# Patient Record
Sex: MALE | Race: WHITE | Employment: OTHER
[De-identification: names, ages, dates, MRNs, and addresses within clinical notes are randomized per-mention and may not be internally consistent; named-entity substitution may affect disease eponyms.]

---

## 2017-01-26 PROBLEM — D37.5 NEOPLASM OF UNCERTAIN BEHAVIOR OF STOMACH, INTESTINES, AND RECTUM: Status: ACTIVE | Noted: 2017-01-26

## 2017-01-26 PROBLEM — D37.8 NEOPLASM OF UNCERTAIN BEHAVIOR OF STOMACH, INTESTINES, AND RECTUM: Status: ACTIVE | Noted: 2017-01-26

## 2017-01-26 PROBLEM — D37.1 NEOPLASM OF UNCERTAIN BEHAVIOR OF STOMACH, INTESTINES, AND RECTUM: Status: ACTIVE | Noted: 2017-01-26

## 2017-01-26 PROBLEM — Z83.719 FAMILY HISTORY OF COLONIC POLYPS: Status: ACTIVE | Noted: 2017-01-26

## 2017-01-26 PROBLEM — Z83.71 FAMILY HISTORY OF COLONIC POLYPS: Status: ACTIVE | Noted: 2017-01-26

## 2017-01-26 PROBLEM — K22.2 STRICTURE AND STENOSIS OF ESOPHAGUS: Status: ACTIVE | Noted: 2017-01-26

## 2017-02-03 ENCOUNTER — SURGERY (OUTPATIENT)
Age: 67
End: 2017-02-03

## 2017-02-03 ENCOUNTER — HOSPITAL ENCOUNTER (OUTPATIENT)
Facility: HOSPITAL | Age: 67
Setting detail: HOSPITAL OUTPATIENT SURGERY
Discharge: HOME | End: 2017-02-03
Attending: INTERNAL MEDICINE | Admitting: INTERNAL MEDICINE
Payer: COMMERCIAL

## 2017-02-03 PROCEDURE — 0DB48ZX EXCISION OF ESOPHAGOGASTRIC JUNCTION, VIA NATURAL OR ARTIFICIAL OPENING ENDOSCOPIC, DIAGNOSTIC: ICD-10-PCS | Performed by: INTERNAL MEDICINE

## 2017-02-03 PROCEDURE — 88312 SPECIAL STAINS GROUP 1: CPT | Performed by: INTERNAL MEDICINE

## 2017-02-03 PROCEDURE — 0D748ZZ DILATION OF ESOPHAGOGASTRIC JUNCTION, VIA NATURAL OR ARTIFICIAL OPENING ENDOSCOPIC: ICD-10-PCS | Performed by: INTERNAL MEDICINE

## 2017-02-03 PROCEDURE — 88305 TISSUE EXAM BY PATHOLOGIST: CPT | Performed by: INTERNAL MEDICINE

## 2017-02-03 RX ORDER — SODIUM CHLORIDE, SODIUM LACTATE, POTASSIUM CHLORIDE, CALCIUM CHLORIDE 600; 310; 30; 20 MG/100ML; MG/100ML; MG/100ML; MG/100ML
INJECTION, SOLUTION INTRAVENOUS CONTINUOUS
Status: DISCONTINUED | OUTPATIENT
Start: 2017-02-03 | End: 2017-02-03

## 2017-02-03 RX ORDER — MIDAZOLAM HYDROCHLORIDE 1 MG/ML
INJECTION INTRAMUSCULAR; INTRAVENOUS
Status: DISCONTINUED | OUTPATIENT
Start: 2017-02-03 | End: 2017-02-03

## 2017-02-03 RX ORDER — SODIUM CHLORIDE 0.9 % (FLUSH) 0.9 %
10 SYRINGE (ML) INJECTION AS NEEDED
Status: DISCONTINUED | OUTPATIENT
Start: 2017-02-03 | End: 2017-02-03

## 2017-02-03 RX ORDER — MIDAZOLAM HYDROCHLORIDE 1 MG/ML
1 INJECTION INTRAMUSCULAR; INTRAVENOUS EVERY 5 MIN PRN
Status: DISCONTINUED | OUTPATIENT
Start: 2017-02-03 | End: 2017-02-03

## 2017-02-03 NOTE — OPERATIVE REPORT
ESOPHAGOGASTRODUODENOSCOPY REPORT    Patient Name:  Ardena Simmonds Record #: [id number]  YOB: 1950  Date of Procedure: 2/3/2017    Referring physician: Lonnie Nelson MD    Surgeon:  Abebe Payne.  Ronan Mcmanus MD    Pre-op diagnosis: Dogu Rees

## 2017-02-03 NOTE — H&P
PRE-PROCEDURE UPDATE    HPI: Varinder Marroquin is a 61year old male. 4/6/1950. Patient presents for an Esophagogastroduodenoscopy. ALLERGIES: No Known Allergies      No current outpatient prescriptions on file.   Past Medical History   Diagnosis Date

## 2017-02-04 VITALS
RESPIRATION RATE: 18 BRPM | OXYGEN SATURATION: 96 % | HEART RATE: 52 BPM | HEIGHT: 69 IN | WEIGHT: 165 LBS | BODY MASS INDEX: 24.44 KG/M2 | DIASTOLIC BLOOD PRESSURE: 70 MMHG | SYSTOLIC BLOOD PRESSURE: 130 MMHG

## 2017-05-24 ENCOUNTER — HOSPITAL (OUTPATIENT)
Dept: OTHER | Age: 67
End: 2017-05-24
Attending: ANESTHESIOLOGY

## 2017-07-10 ENCOUNTER — APPOINTMENT (OUTPATIENT)
Dept: LAB | Facility: HOSPITAL | Age: 67
End: 2017-07-10
Attending: UROLOGY
Payer: MEDICARE

## 2017-07-10 PROCEDURE — 84153 ASSAY OF PSA TOTAL: CPT | Performed by: UROLOGY

## 2017-07-10 PROCEDURE — 81003 URINALYSIS AUTO W/O SCOPE: CPT | Performed by: UROLOGY

## 2017-07-12 ENCOUNTER — TELEPHONE (OUTPATIENT)
Dept: NEUROLOGY | Facility: CLINIC | Age: 67
End: 2017-07-12

## 2017-07-12 ENCOUNTER — OFFICE VISIT (OUTPATIENT)
Dept: SURGERY | Facility: CLINIC | Age: 67
End: 2017-07-12

## 2017-07-12 VITALS
HEART RATE: 48 BPM | HEIGHT: 69 IN | BODY MASS INDEX: 24.44 KG/M2 | DIASTOLIC BLOOD PRESSURE: 62 MMHG | RESPIRATION RATE: 16 BRPM | WEIGHT: 165 LBS | SYSTOLIC BLOOD PRESSURE: 108 MMHG

## 2017-07-12 DIAGNOSIS — N52.01 ERECTILE DYSFUNCTION DUE TO ARTERIAL INSUFFICIENCY: ICD-10-CM

## 2017-07-12 DIAGNOSIS — C61 PROSTATE CANCER (HCC): Primary | ICD-10-CM

## 2017-07-12 DIAGNOSIS — R39.15 URINARY URGENCY: ICD-10-CM

## 2017-07-12 DIAGNOSIS — R35.1 NOCTURIA: ICD-10-CM

## 2017-07-12 PROCEDURE — 99214 OFFICE O/P EST MOD 30 MIN: CPT | Performed by: UROLOGY

## 2017-07-12 PROCEDURE — 99212 OFFICE O/P EST SF 10 MIN: CPT | Performed by: UROLOGY

## 2017-07-12 RX ORDER — OXYBUTYNIN CHLORIDE 5 MG/1
TABLET ORAL
Qty: 10 TABLET | Refills: 1 | Status: ON HOLD | OUTPATIENT
Start: 2017-07-12 | End: 2018-11-28

## 2017-07-12 NOTE — PROGRESS NOTES
HPI:    Patient ID: eTssa Reyes is a 74year old male. HPI     1. Nocturia  Patient's AUA score today was 7, mild voiding dysfunction category better than AUA 9.5 on chart review (8/3/2016).   The patient has urinary frequency less than every 2 hours obstruction; 5 mm erythematous lesion removed and it was benign and retrograde pyelograms normal. 3/25/13 palladium brachytherapy.  The patient in consultation office 5/23/14; complains of erectile dysfunction; I started patient on Viagra 50 mg when necess every morning before breakfast. Disp: 90 tablet Rfl: 0   Tadalafil (CIALIS) 20 MG Oral Tab Please take 1-2 hours before planned sexual activity; do not take with alcohol.  Disp: 5 tablet Rfl: 11     Allergies:No Known Allergies      PHYSICAL EXAM:   Physica (R35.1) Nocturia  Plan: Patient's AUA score today was 7, mild voiding dysfunction category better than AUA 9.5 on chart review (8/3/2016).   The patient has urinary frequency less than every 2 hours, intermittent stream, weak stream and nocturia 1 x per nig entries made by the scribe were at my direction and in my presence. I have reviewed the chart and discharge instructions (if applicable) and agree that the record reflects my personal performance and is accurate and complete.   Kacie Ramos MD, 7/12/2

## 2017-07-12 NOTE — PATIENT INSTRUCTIONS
1. For occasional urinary urgency when traveling, Oxybutynin 5 mg P.O.     2. For your erectile dysfunction, consider taking Viagra 50 mg simultaneously with either a Tylenol or Aleve (because of the headaches you experience from the Viagra).     3. Continu effects of oral ED medications  · Headache  · Facial flushing  · Runny or stuffy nose  · Indigestion  · Distortion of your color vision for a short time  · Sudden vision loss or hearing loss (rare)  Risks of oral ED medications  · Do not take ED medication

## 2017-07-13 ENCOUNTER — OFFICE VISIT (OUTPATIENT)
Dept: NEUROLOGY | Facility: CLINIC | Age: 67
End: 2017-07-13

## 2017-07-13 ENCOUNTER — TELEPHONE (OUTPATIENT)
Dept: NEUROLOGY | Facility: CLINIC | Age: 67
End: 2017-07-13

## 2017-07-13 VITALS
BODY MASS INDEX: 24.44 KG/M2 | HEIGHT: 69 IN | HEART RATE: 57 BPM | WEIGHT: 165 LBS | DIASTOLIC BLOOD PRESSURE: 64 MMHG | OXYGEN SATURATION: 95 % | SYSTOLIC BLOOD PRESSURE: 132 MMHG

## 2017-07-13 DIAGNOSIS — M54.16 LUMBAR RADICULOPATHY: Primary | ICD-10-CM

## 2017-07-13 DIAGNOSIS — G89.29 CHRONIC RIGHT-SIDED LOW BACK PAIN WITH RIGHT-SIDED SCIATICA: ICD-10-CM

## 2017-07-13 DIAGNOSIS — M54.41 CHRONIC RIGHT-SIDED LOW BACK PAIN WITH RIGHT-SIDED SCIATICA: ICD-10-CM

## 2017-07-13 PROCEDURE — 99204 OFFICE O/P NEW MOD 45 MIN: CPT | Performed by: PHYSICAL MEDICINE & REHABILITATION

## 2017-07-13 NOTE — PROGRESS NOTES
Low Back Pain H & P    Chief Complaint:  Patient presents with:  Low Back Pain: referred by Dr. Hillary Chandra, pt c/o lower back pain and states it is mostly in lower right side, pain is localized, denies any numbness or tingling, says pain is constant worse at arthroscopy   • Other and unspecified hyperlipidemia    • Prostate cancer [facility]) 2013    Per NG: [place] Prostate-Seeds implant with Dr. Jer Cummins       Past Surgical History   Past Surgical History:  10/12: COLONOSCOPY  2/3/2017: EGD N/A      Comment: Procedur bruising  Allergic/Immuno: Negative for contact allergy, environment allergies, food allergies, seasonal allergies. Gait:   The patient has no difficulty walking. PE:  The patient does appear in his stated age in no distress.   The patient is well g bilateral lower extremities     Hip: Hips are stable.     RIGHT hip ROM mild-moderately limited   LEFT hip ROM mildly limited   RIGHT hip flexion Negative pain   LEFT hip flexion Negative pain   RIGHT hip NATHALY test Negative for pain   LEFT hip NATHALY test N

## 2017-07-13 NOTE — PATIENT INSTRUCTIONS
Plan  He will get a MRI of the lumbar spine. I will do an EMG/NCS of the right leg looking for a right L5-S1 radiculopathy versus a sciatic neuropathy versus a right lateral femoral cutaneous neuropathy.     The patient will continue with his home exer

## 2017-07-14 NOTE — TELEPHONE ENCOUNTER
Pt. informed insurance was verified and MRI L-spine wo and Physical therapy are a covered benefit and they do not require authorization. Can proceed with scheduling appts. Rohit Christensen

## 2017-07-24 ENCOUNTER — HOSPITAL ENCOUNTER (OUTPATIENT)
Dept: MRI IMAGING | Facility: HOSPITAL | Age: 67
Discharge: HOME | End: 2017-07-24
Attending: PHYSICAL MEDICINE & REHABILITATION
Payer: MEDICARE

## 2017-07-24 DIAGNOSIS — M54.16 LUMBAR RADICULOPATHY: ICD-10-CM

## 2017-07-24 PROCEDURE — 72148 MRI LUMBAR SPINE W/O DYE: CPT | Performed by: PHYSICAL MEDICINE & REHABILITATION

## 2017-07-25 ENCOUNTER — APPOINTMENT (OUTPATIENT)
Dept: PHYSICAL THERAPY | Facility: HOSPITAL | Age: 67
End: 2017-07-25
Attending: PHYSICAL MEDICINE & REHABILITATION
Payer: MEDICARE

## 2017-08-07 ENCOUNTER — OFFICE VISIT (OUTPATIENT)
Dept: PHYSICAL THERAPY | Facility: HOSPITAL | Age: 67
End: 2017-08-07
Attending: PHYSICAL MEDICINE & REHABILITATION
Payer: MEDICARE

## 2017-08-07 DIAGNOSIS — G89.29 CHRONIC RIGHT-SIDED LOW BACK PAIN WITH RIGHT-SIDED SCIATICA: ICD-10-CM

## 2017-08-07 DIAGNOSIS — M54.16 LUMBAR RADICULOPATHY: ICD-10-CM

## 2017-08-07 DIAGNOSIS — M54.41 CHRONIC RIGHT-SIDED LOW BACK PAIN WITH RIGHT-SIDED SCIATICA: ICD-10-CM

## 2017-08-07 PROCEDURE — 97140 MANUAL THERAPY 1/> REGIONS: CPT

## 2017-08-07 PROCEDURE — 97161 PT EVAL LOW COMPLEX 20 MIN: CPT

## 2017-08-07 NOTE — PROGRESS NOTES
LUMBAR SPINE EVALUATION:   Referring Physician: Dr. Calderon Nurse  Diagnosis: Lumbar radiculopathy (M54.16)  Chronic right-sided low back pain with right-sided sciatica (M54.41,G89.29)   Date of Service: 8/7/2017   Date of Onset: ~1 week ago  PATIENT SUMMARY therapy with c/o LBP R>L. Denies any radicular symptoms currently. Denies any bowel/bladder dysfunction. Reports current pain  0/10 and states pain increases to an 8/10 when present.  Objective testing demonstrates global lumbar/LE AROM WNL except for 25% l 25% limited in global hip ROM B.      Accessory Joint Mobilization: Lumbar Spine  Central: mild hypomobility  Unilateral: mild hypomobility    MMT STRENGTH TESTING:  R/L, -/5   8/7/2017   Hip Flexion 4/4   Hip Extension 4/4   Hip Adduction 5/5   Hip Abducti twisting motions. PLAN OF CARE:    Long Term Goals Timeframe (8/7-10/9)  1.  Patient to be independent with progressive HEP during and upon discharge to aide with symptom management and return to previous level of function.    2. Patient to increase g my care.     X___________________________________________________ Date____________________

## 2017-08-17 ENCOUNTER — OFFICE VISIT (OUTPATIENT)
Dept: PHYSICAL THERAPY | Facility: HOSPITAL | Age: 67
End: 2017-08-17
Attending: INTERNAL MEDICINE
Payer: MEDICARE

## 2017-08-17 PROCEDURE — 97112 NEUROMUSCULAR REEDUCATION: CPT

## 2017-08-17 PROCEDURE — 97140 MANUAL THERAPY 1/> REGIONS: CPT

## 2017-08-17 NOTE — PROGRESS NOTES
Dx: Lumbar radiculopathy (M54.16)  Chronic right-sided low back pain with right-sided sciatica (M54.41,G89.29)          Authorized # of Visits/Insurance:  Medicare  Script Dates: 8/7-10/9           Next MD visit: none scheduled  Referring MD: Jamia Simon symptom management and return to previous level of function.    2. Patient to increase global proximal hip/core strength 1/2 MMT in order to improve lumbopelvic stability and load transferring during repetitive twisting/bending activity such as golfing and

## 2017-08-23 ENCOUNTER — OFFICE VISIT (OUTPATIENT)
Dept: PHYSICAL THERAPY | Facility: HOSPITAL | Age: 67
End: 2017-08-23
Attending: PHYSICAL MEDICINE & REHABILITATION
Payer: MEDICARE

## 2017-08-23 DIAGNOSIS — G89.29 CHRONIC RIGHT-SIDED LOW BACK PAIN WITH RIGHT-SIDED SCIATICA: ICD-10-CM

## 2017-08-23 DIAGNOSIS — M54.41 CHRONIC RIGHT-SIDED LOW BACK PAIN WITH RIGHT-SIDED SCIATICA: ICD-10-CM

## 2017-08-23 DIAGNOSIS — M54.16 LUMBAR RADICULOPATHY: ICD-10-CM

## 2017-08-23 PROCEDURE — 97140 MANUAL THERAPY 1/> REGIONS: CPT

## 2017-08-23 PROCEDURE — 97112 NEUROMUSCULAR REEDUCATION: CPT

## 2017-08-23 NOTE — PROGRESS NOTES
Dx: Lumbar radiculopathy (M54.16)  Chronic right-sided low back pain with right-sided sciatica (M54.41,G89.29)          Authorized # of Visits/Insurance:  Medicare  Script Dates: 8/7-10/9           Next MD visit: none scheduled  Referring MD: Jamia Simon requiring moderate verbal/visual/tactile cueing to achieve. Plan: Continue to focus on lumbar spine unloading via TA activation, lumbopelvic stabilization, and LE/core strength progression as tolerated.      Charges: 1 Man, 2 NMR      Total Timed Treatm

## 2017-09-18 ENCOUNTER — OFFICE VISIT (OUTPATIENT)
Dept: PHYSICAL THERAPY | Facility: HOSPITAL | Age: 67
End: 2017-09-18
Attending: PHYSICAL MEDICINE & REHABILITATION
Payer: MEDICARE

## 2017-09-18 PROCEDURE — 97112 NEUROMUSCULAR REEDUCATION: CPT

## 2017-09-18 PROCEDURE — 97140 MANUAL THERAPY 1/> REGIONS: CPT

## 2017-09-18 NOTE — PROGRESS NOTES
Patient Name: Avery Herman  YOB: 1950          MRN number:  [id number]  Date:  9/18/2017  Referring Physician:  Sandra Calvo  Dx: Lumbar radiculopathy (M54.16)  Chronic right-sided low back pain with right-sided sciatica (M54.41,G89.29) demonstrated any significant change in symptoms since starting therapy, but has been able to manage symptoms independently at home and has been consistent with HEP.  Recommend discharge appropriate at this time and to follow-up with MD. Reviewed/updated HEP SKTC, DKTC, LTR, piriformis stretch seated   8/17: TA Chepermann progression, TA transition from supine to standing  8/23: standing hip flex/ext/abd, brad madera   9/18: SB iso TA/RA/Oblique, supine SLR, S/L hip abd, prone hip ext, TA with functional m Changing and Maintaining Body Position CI: 1%-19% impaired, limited, or restricted    Rehab Potential: good  Clinical Presentation: stable    Plan: Patient discharged to independent home program. Will follow-up with MD in October.      Patient was advised o

## 2017-10-11 PROBLEM — M51.26 LUMBAR HERNIATED DISC: Status: ACTIVE | Noted: 2017-10-11

## 2017-10-11 PROBLEM — M48.061 LUMBAR FORAMINAL STENOSIS: Status: ACTIVE | Noted: 2017-10-11

## 2017-10-11 PROBLEM — M43.16 SPONDYLOLISTHESIS OF LUMBAR REGION: Status: ACTIVE | Noted: 2017-10-11

## 2017-10-11 PROBLEM — M51.9 LUMBAR DISC DISEASE: Status: ACTIVE | Noted: 2017-10-11

## 2017-10-11 PROBLEM — M48.061 LUMBAR STENOSIS WITHOUT NEUROGENIC CLAUDICATION: Status: ACTIVE | Noted: 2017-10-11

## 2017-10-11 PROBLEM — M43.10 RETROLISTHESIS: Status: ACTIVE | Noted: 2017-10-11

## 2017-10-23 ENCOUNTER — OFFICE VISIT (OUTPATIENT)
Dept: NEUROLOGY | Facility: CLINIC | Age: 67
End: 2017-10-23

## 2017-10-23 VITALS
RESPIRATION RATE: 16 BRPM | WEIGHT: 165 LBS | HEIGHT: 69 IN | DIASTOLIC BLOOD PRESSURE: 60 MMHG | HEART RATE: 60 BPM | SYSTOLIC BLOOD PRESSURE: 110 MMHG | BODY MASS INDEX: 24.44 KG/M2

## 2017-10-23 DIAGNOSIS — M54.16 LUMBAR RADICULOPATHY: ICD-10-CM

## 2017-10-23 DIAGNOSIS — M48.061 LUMBAR STENOSIS WITHOUT NEUROGENIC CLAUDICATION: ICD-10-CM

## 2017-10-23 DIAGNOSIS — M43.10 RETROLISTHESIS: ICD-10-CM

## 2017-10-23 DIAGNOSIS — M54.41 CHRONIC RIGHT-SIDED LOW BACK PAIN WITH RIGHT-SIDED SCIATICA: ICD-10-CM

## 2017-10-23 DIAGNOSIS — G89.29 CHRONIC RIGHT-SIDED LOW BACK PAIN WITH RIGHT-SIDED SCIATICA: ICD-10-CM

## 2017-10-23 DIAGNOSIS — M43.16 SPONDYLOLISTHESIS OF LUMBAR REGION: Primary | ICD-10-CM

## 2017-10-23 DIAGNOSIS — M51.9 LUMBAR DISC DISEASE: ICD-10-CM

## 2017-10-23 DIAGNOSIS — M51.26 LUMBAR HERNIATED DISC: ICD-10-CM

## 2017-10-23 DIAGNOSIS — M48.061 LUMBAR FORAMINAL STENOSIS: ICD-10-CM

## 2017-10-23 PROCEDURE — 99214 OFFICE O/P EST MOD 30 MIN: CPT | Performed by: PHYSICAL MEDICINE & REHABILITATION

## 2017-10-23 NOTE — PATIENT INSTRUCTIONS
As of October 6th 2014, the Drug Enforcement Agency [facility]) is reclassifying all hydrocodone combination medications from Schedule III to Schedule II. This includes medications such as Norco, Vicodin, Lortab, Zohydro, and Vicoprofen.     What this means for y discussed with the patient his home exercise program.    He will follow up with Dr. Abad Viera about the Adenoma seen on the MRI scan.     We decided to hold on getting a right leg EMG/NCS and lumbar flexion and extension x-rays since the results will not change

## 2017-10-23 NOTE — PROGRESS NOTES
I spoke to Dr. Sia Moody who read the MRI and he stated that this is an old finding and has not changed since the patient's last CT scan.

## 2017-10-23 NOTE — PROGRESS NOTES
Low Back Pain H & P    Chief Complaint: Patient presents with:  Low Back Pain: LOV: 7/13/17. Patient is here for a f/u on low back pain. Pt had MRI done on 7/23/17. Pt completed PT w/ not much relief. Pt is continuing w/ home exercises.  Pt states he contin Kidney Disease Father      Per NG: Renal disease   • Heart Disease Father 80     Per NG: Cause of death        Social History     Social History  Social History   Marital status:   Spouse name: N/A    Years of education: N/A  Number of children: N/A Non-tender for bilateral Greater Trochanteric Bursa     Vascular lower extremity:   Dorsalis pedis pulse-RIGHT 2+   Dorsalis pedis pulse-LEFT 2+   Tibialis posterior pulse-RIGHT 2+   Tibialis posterior pulse-LEFT 2+     Neurological Lower Extremity:    Lig

## 2017-11-08 ENCOUNTER — HOSPITAL ENCOUNTER (OUTPATIENT)
Facility: HOSPITAL | Age: 67
Setting detail: HOSPITAL OUTPATIENT SURGERY
Discharge: HOME | End: 2017-11-08
Attending: INTERNAL MEDICINE | Admitting: INTERNAL MEDICINE
Payer: MEDICARE

## 2017-11-08 ENCOUNTER — SURGERY (OUTPATIENT)
Age: 67
End: 2017-11-08

## 2017-11-08 VITALS
SYSTOLIC BLOOD PRESSURE: 124 MMHG | DIASTOLIC BLOOD PRESSURE: 78 MMHG | HEART RATE: 60 BPM | OXYGEN SATURATION: 96 % | RESPIRATION RATE: 19 BRPM

## 2017-11-08 DIAGNOSIS — Z12.11 SCREENING FOR COLORECTAL CANCER: ICD-10-CM

## 2017-11-08 DIAGNOSIS — Z12.12 SCREENING FOR COLORECTAL CANCER: ICD-10-CM

## 2017-11-08 PROCEDURE — 88305 TISSUE EXAM BY PATHOLOGIST: CPT | Performed by: INTERNAL MEDICINE

## 2017-11-08 PROCEDURE — 0DBL8ZX EXCISION OF TRANSVERSE COLON, VIA NATURAL OR ARTIFICIAL OPENING ENDOSCOPIC, DIAGNOSTIC: ICD-10-PCS | Performed by: INTERNAL MEDICINE

## 2017-11-08 RX ORDER — MIDAZOLAM HYDROCHLORIDE 1 MG/ML
1 INJECTION INTRAMUSCULAR; INTRAVENOUS EVERY 5 MIN PRN
Status: DISCONTINUED | OUTPATIENT
Start: 2017-11-08 | End: 2017-11-08

## 2017-11-08 RX ORDER — MIDAZOLAM HYDROCHLORIDE 1 MG/ML
INJECTION INTRAMUSCULAR; INTRAVENOUS
Status: DISCONTINUED | OUTPATIENT
Start: 2017-11-08 | End: 2017-11-08

## 2017-11-08 RX ORDER — SODIUM CHLORIDE 0.9 % (FLUSH) 0.9 %
10 SYRINGE (ML) INJECTION AS NEEDED
Status: CANCELLED | OUTPATIENT
Start: 2017-11-08

## 2017-11-08 RX ORDER — SODIUM CHLORIDE, SODIUM LACTATE, POTASSIUM CHLORIDE, CALCIUM CHLORIDE 600; 310; 30; 20 MG/100ML; MG/100ML; MG/100ML; MG/100ML
INJECTION, SOLUTION INTRAVENOUS CONTINUOUS
Status: CANCELLED | OUTPATIENT
Start: 2017-11-08

## 2017-11-08 NOTE — H&P
RE-PROCEDURE UPDATE    HPI: Tonya Pierre is a 74year old male. 4/6/1950. Patient presents for a colonoscopy. ALLERGIES: No Known Allergies      No current outpatient prescriptions on file.   Past Medical History:   Diagnosis Date   • Bone spur

## 2017-11-08 NOTE — OPERATIVE REPORT
COLONOSCOPY REPORT    Patient Name:  Michaela Hickey Record #: [id number]  YOB: 1950  Date of Procedure: 11/8/2017    Referring physician: Jen Muniz MD    Surgeon:  Nya Watson.  Meryle Nailer, MD    Pre-op diagnosis: Colon cancer screenin

## 2017-12-05 ENCOUNTER — PRIOR ORIGINAL RECORDS (OUTPATIENT)
Dept: OTHER | Age: 67
End: 2017-12-05

## 2017-12-05 ENCOUNTER — LAB REQUISITION (OUTPATIENT)
Dept: LAB | Facility: HOSPITAL | Age: 67
End: 2017-12-05
Payer: MEDICARE

## 2017-12-05 DIAGNOSIS — Z85.46 PERSONAL HISTORY OF MALIGNANT NEOPLASM OF PROSTATE: ICD-10-CM

## 2017-12-05 DIAGNOSIS — E55.9 VITAMIN D DEFICIENCY: ICD-10-CM

## 2017-12-05 DIAGNOSIS — E78.00 PURE HYPERCHOLESTEROLEMIA: ICD-10-CM

## 2017-12-05 PROCEDURE — 85025 COMPLETE CBC W/AUTO DIFF WBC: CPT | Performed by: INTERNAL MEDICINE

## 2017-12-05 PROCEDURE — 84153 ASSAY OF PSA TOTAL: CPT | Performed by: INTERNAL MEDICINE

## 2017-12-05 PROCEDURE — 80061 LIPID PANEL: CPT | Performed by: INTERNAL MEDICINE

## 2017-12-05 PROCEDURE — 82306 VITAMIN D 25 HYDROXY: CPT | Performed by: INTERNAL MEDICINE

## 2017-12-05 PROCEDURE — 84443 ASSAY THYROID STIM HORMONE: CPT | Performed by: INTERNAL MEDICINE

## 2017-12-05 PROCEDURE — 80053 COMPREHEN METABOLIC PANEL: CPT | Performed by: INTERNAL MEDICINE

## 2017-12-07 LAB
ALBUMIN: 4 G/DL
ALKALINE PHOSPHATATE(ALK PHOS): 66 IU/L
BILIRUBIN TOTAL: 0.8 MG/DL
BUN: 16 MG/DL
CALCIUM: 9.4 MG/DL
CHLORIDE: 106 MEQ/L
CREATININE, SERUM: 1.15 MG/DL
GLOBULIN: 2.6 G/DL
GLUCOSE: 99 MG/DL
HEMATOCRIT: 43.7 %
HEMOGLOBIN: 14.8 G/DL
MCH: 30.8 PG
MCHC: 33.6 G/DL
MCV: 90.9 FL
PLATELETS: 186 K/UL
POTASSIUM, SERUM: 4.1 MEQ/L
PROTEIN, TOTAL: 6.6 G/DL
RED BLOOD COUNT: 4.81 X 10-6/U
SGOT (AST): 25 IU/L
SGPT (ALT): 24 IU/L
SODIUM: 139 MEQ/L
VITAMIN D 25-OH: 29.3 NG/ML
WHITE BLOOD COUNT: 5.5 X 10-3/U

## 2017-12-11 ENCOUNTER — PRIOR ORIGINAL RECORDS (OUTPATIENT)
Dept: OTHER | Age: 67
End: 2017-12-11

## 2017-12-11 LAB
ALT (SGPT): 24 U/L
AST (SGOT): 25 U/L
CHOLESTEROL, TOTAL: 162 MG/DL
GLUCOSE: 99 MG/DL
GLYCOHEMOGLOBIN: 1.32 %
HDL CHOLESTEROL: 52 MG/DL
LDL CHOLESTEROL: 95 MG/DL
TRIGLYCERIDES: 75 MG/DL

## 2017-12-18 ENCOUNTER — PRIOR ORIGINAL RECORDS (OUTPATIENT)
Dept: OTHER | Age: 67
End: 2017-12-18

## 2017-12-19 ENCOUNTER — MYAURORA ACCOUNT LINK (OUTPATIENT)
Dept: OTHER | Age: 67
End: 2017-12-19

## 2017-12-22 ENCOUNTER — PRIOR ORIGINAL RECORDS (OUTPATIENT)
Dept: OTHER | Age: 67
End: 2017-12-22

## 2017-12-22 ENCOUNTER — MYAURORA ACCOUNT LINK (OUTPATIENT)
Dept: OTHER | Age: 67
End: 2017-12-22

## 2017-12-27 ENCOUNTER — HOSPITAL ENCOUNTER (OUTPATIENT)
Dept: CV DIAGNOSTICS | Facility: HOSPITAL | Age: 67
Discharge: HOME | End: 2017-12-27
Attending: INTERNAL MEDICINE
Payer: MEDICARE

## 2017-12-27 DIAGNOSIS — I49.9 SUPRAVENTRICULAR ARRHYTHMIA: ICD-10-CM

## 2017-12-27 PROCEDURE — 93227 XTRNL ECG REC<48 HR R&I: CPT | Performed by: INTERNAL MEDICINE

## 2017-12-28 ENCOUNTER — HOSPITAL ENCOUNTER (OUTPATIENT)
Dept: ULTRASOUND IMAGING | Facility: HOSPITAL | Age: 67
Discharge: HOME | End: 2017-12-28
Attending: INTERNAL MEDICINE
Payer: MEDICARE

## 2017-12-28 ENCOUNTER — HOSPITAL ENCOUNTER (OUTPATIENT)
Dept: CV DIAGNOSTICS | Facility: HOSPITAL | Age: 67
Discharge: HOME | End: 2017-12-28
Attending: INTERNAL MEDICINE
Payer: MEDICARE

## 2017-12-28 DIAGNOSIS — R09.89 CAROTID BRUIT: ICD-10-CM

## 2017-12-28 PROCEDURE — 93225 XTRNL ECG REC<48 HRS REC: CPT | Performed by: INTERNAL MEDICINE

## 2017-12-28 PROCEDURE — 93880 EXTRACRANIAL BILAT STUDY: CPT | Performed by: INTERNAL MEDICINE

## 2017-12-29 ENCOUNTER — PRIOR ORIGINAL RECORDS (OUTPATIENT)
Dept: OTHER | Age: 67
End: 2017-12-29

## 2018-01-04 ENCOUNTER — PRIOR ORIGINAL RECORDS (OUTPATIENT)
Dept: OTHER | Age: 68
End: 2018-01-04

## 2018-01-05 ENCOUNTER — PRIOR ORIGINAL RECORDS (OUTPATIENT)
Dept: OTHER | Age: 68
End: 2018-01-05

## 2018-06-26 ENCOUNTER — APPOINTMENT (OUTPATIENT)
Dept: LAB | Facility: HOSPITAL | Age: 68
End: 2018-06-26
Attending: INTERNAL MEDICINE
Payer: MEDICARE

## 2018-06-26 ENCOUNTER — HOSPITAL ENCOUNTER (OUTPATIENT)
Dept: GENERAL RADIOLOGY | Facility: HOSPITAL | Age: 68
Discharge: HOME | End: 2018-06-26
Attending: INTERNAL MEDICINE
Payer: MEDICARE

## 2018-06-26 DIAGNOSIS — R35.1 NOCTURIA: ICD-10-CM

## 2018-06-26 DIAGNOSIS — R39.15 URINARY URGENCY: ICD-10-CM

## 2018-06-26 DIAGNOSIS — C61 PROSTATE CANCER (HCC): ICD-10-CM

## 2018-06-26 DIAGNOSIS — S79.912A INJURY OF LEFT HIP, INITIAL ENCOUNTER: ICD-10-CM

## 2018-06-26 PROCEDURE — 84153 ASSAY OF PSA TOTAL: CPT

## 2018-06-26 PROCEDURE — 73502 X-RAY EXAM HIP UNI 2-3 VIEWS: CPT | Performed by: INTERNAL MEDICINE

## 2018-06-26 PROCEDURE — 36415 COLL VENOUS BLD VENIPUNCTURE: CPT

## 2018-06-26 PROCEDURE — 81003 URINALYSIS AUTO W/O SCOPE: CPT

## 2018-06-27 ENCOUNTER — PRIOR ORIGINAL RECORDS (OUTPATIENT)
Dept: OTHER | Age: 68
End: 2018-06-27

## 2018-07-13 ENCOUNTER — OFFICE VISIT (OUTPATIENT)
Dept: SURGERY | Facility: CLINIC | Age: 68
End: 2018-07-13

## 2018-07-13 VITALS
DIASTOLIC BLOOD PRESSURE: 72 MMHG | TEMPERATURE: 98 F | BODY MASS INDEX: 24.44 KG/M2 | HEIGHT: 69 IN | SYSTOLIC BLOOD PRESSURE: 123 MMHG | WEIGHT: 165 LBS | RESPIRATION RATE: 16 BRPM | HEART RATE: 40 BPM

## 2018-07-13 DIAGNOSIS — R35.1 NOCTURIA: ICD-10-CM

## 2018-07-13 DIAGNOSIS — C61 PROSTATE CANCER (HCC): Primary | ICD-10-CM

## 2018-07-13 DIAGNOSIS — N52.01 ERECTILE DYSFUNCTION DUE TO ARTERIAL INSUFFICIENCY: ICD-10-CM

## 2018-07-13 PROCEDURE — G0463 HOSPITAL OUTPT CLINIC VISIT: HCPCS | Performed by: UROLOGY

## 2018-07-13 PROCEDURE — 99214 OFFICE O/P EST MOD 30 MIN: CPT | Performed by: UROLOGY

## 2018-07-13 NOTE — PATIENT INSTRUCTIONS
Visit in 1 year. Blood draw for PSA and urinalysis urine test 1--10 days before visit. No sexual stimulation for 5 days before the actual PSA blood draw. The heart healthy diet is the prostate healthy diet.  Eat healthy food choices from the [address] [place] [address]

## 2018-07-15 NOTE — PROGRESS NOTES
HPI:    Patient ID: Austin Kelly is a 79year old male. HPI     1.  Nocturia  Patient's AUA score today was 8, beginning of the moderate voiding dysfunction category, just slightly worse than the score of 7 that he had last visit 7/12/17 on chart revi erythematous lesion removed and it was benign and retrograde pyelograms normal. 3/25/13 palladium brachytherapy.  The patient in consultation office 5/23/14; complains of erectile dysfunction; I started patient on Viagra 50 mg when necessary; he also had p Glucosamine-Chondroitin-MSM ([facility] GLUCOSAMINE-CHONDROIT-MSM) 375-300-250 MG Oral Tab Take 1 tablet by mouth daily. Disp:  Rfl:    CRESTOR 10 MG Oral Tab Take 10 mg by mouth nightly.    Disp:  Rfl:    Oxybutynin Chloride 5 MG Oral Tab Take 1 tab daily as this condition is stable. Though he has Viagra at home, he does not use it. I did again discuss that he may try the Viagra together with Tylenol or Aleve as this may improve the headache that he develops upon taking Viagra;  I explained other treatment op

## 2018-11-20 ENCOUNTER — LAB REQUISITION (OUTPATIENT)
Dept: LAB | Facility: HOSPITAL | Age: 68
End: 2018-11-20
Payer: MEDICARE

## 2018-11-20 DIAGNOSIS — M79.10 MYALGIA, UNSPECIFIED SITE: ICD-10-CM

## 2018-11-20 DIAGNOSIS — R53.83 OTHER FATIGUE: ICD-10-CM

## 2018-11-20 PROCEDURE — 82550 ASSAY OF CK (CPK): CPT | Performed by: INTERNAL MEDICINE

## 2018-11-20 PROCEDURE — 85025 COMPLETE CBC W/AUTO DIFF WBC: CPT | Performed by: INTERNAL MEDICINE

## 2018-11-20 PROCEDURE — 80053 COMPREHEN METABOLIC PANEL: CPT | Performed by: INTERNAL MEDICINE

## 2018-11-28 ENCOUNTER — APPOINTMENT (OUTPATIENT)
Dept: CV DIAGNOSTICS | Facility: HOSPITAL | Age: 68
End: 2018-11-28
Attending: EMERGENCY MEDICINE
Payer: MEDICARE

## 2018-11-28 ENCOUNTER — APPOINTMENT (OUTPATIENT)
Dept: GENERAL RADIOLOGY | Facility: HOSPITAL | Age: 68
End: 2018-11-28
Attending: EMERGENCY MEDICINE
Payer: MEDICARE

## 2018-11-28 ENCOUNTER — APPOINTMENT (OUTPATIENT)
Dept: CT IMAGING | Facility: HOSPITAL | Age: 68
End: 2018-11-28
Attending: INTERNAL MEDICINE
Payer: MEDICARE

## 2018-11-28 ENCOUNTER — HOSPITAL ENCOUNTER (OUTPATIENT)
Facility: HOSPITAL | Age: 68
Setting detail: OBSERVATION
Discharge: HOME | End: 2018-11-29
Attending: EMERGENCY MEDICINE | Admitting: INTERNAL MEDICINE
Payer: MEDICARE

## 2018-11-28 ENCOUNTER — PRIOR ORIGINAL RECORDS (OUTPATIENT)
Dept: OTHER | Age: 68
End: 2018-11-28

## 2018-11-28 ENCOUNTER — APPOINTMENT (OUTPATIENT)
Dept: CT IMAGING | Facility: HOSPITAL | Age: 68
End: 2018-11-28
Attending: NURSE PRACTITIONER
Payer: MEDICARE

## 2018-11-28 DIAGNOSIS — R06.00 DYSPNEA, UNSPECIFIED TYPE: Primary | ICD-10-CM

## 2018-11-28 PROCEDURE — 96374 THER/PROPH/DIAG INJ IV PUSH: CPT

## 2018-11-28 PROCEDURE — 85379 FIBRIN DEGRADATION QUANT: CPT | Performed by: EMERGENCY MEDICINE

## 2018-11-28 PROCEDURE — 81001 URINALYSIS AUTO W/SCOPE: CPT | Performed by: EMERGENCY MEDICINE

## 2018-11-28 PROCEDURE — 87207 SMEAR SPECIAL STAIN: CPT | Performed by: EMERGENCY MEDICINE

## 2018-11-28 PROCEDURE — 36415 COLL VENOUS BLD VENIPUNCTURE: CPT

## 2018-11-28 PROCEDURE — 80048 BASIC METABOLIC PNL TOTAL CA: CPT

## 2018-11-28 PROCEDURE — 84484 ASSAY OF TROPONIN QUANT: CPT | Performed by: EMERGENCY MEDICINE

## 2018-11-28 PROCEDURE — 85025 COMPLETE CBC W/AUTO DIFF WBC: CPT | Performed by: EMERGENCY MEDICINE

## 2018-11-28 PROCEDURE — 93010 ELECTROCARDIOGRAM REPORT: CPT | Performed by: NURSE PRACTITIONER

## 2018-11-28 PROCEDURE — 84484 ASSAY OF TROPONIN QUANT: CPT

## 2018-11-28 PROCEDURE — 93010 ELECTROCARDIOGRAM REPORT: CPT | Performed by: INTERNAL MEDICINE

## 2018-11-28 PROCEDURE — 71275 CT ANGIOGRAPHY CHEST: CPT | Performed by: INTERNAL MEDICINE

## 2018-11-28 PROCEDURE — 99285 EMERGENCY DEPT VISIT HI MDM: CPT

## 2018-11-28 PROCEDURE — 84484 ASSAY OF TROPONIN QUANT: CPT | Performed by: INTERNAL MEDICINE

## 2018-11-28 PROCEDURE — 75574 CT ANGIO HRT W/3D IMAGE: CPT | Performed by: NURSE PRACTITIONER

## 2018-11-28 PROCEDURE — 85025 COMPLETE CBC W/AUTO DIFF WBC: CPT

## 2018-11-28 PROCEDURE — 71046 X-RAY EXAM CHEST 2 VIEWS: CPT | Performed by: EMERGENCY MEDICINE

## 2018-11-28 PROCEDURE — 93306 TTE W/DOPPLER COMPLETE: CPT | Performed by: EMERGENCY MEDICINE

## 2018-11-28 PROCEDURE — 93005 ELECTROCARDIOGRAM TRACING: CPT

## 2018-11-28 PROCEDURE — 96372 THER/PROPH/DIAG INJ SC/IM: CPT

## 2018-11-28 PROCEDURE — 80048 BASIC METABOLIC PNL TOTAL CA: CPT | Performed by: EMERGENCY MEDICINE

## 2018-11-28 RX ORDER — DILTIAZEM HYDROCHLORIDE 5 MG/ML
10 INJECTION INTRAVENOUS SEE ADMIN INSTRUCTIONS
Status: DISCONTINUED | OUTPATIENT
Start: 2018-11-28 | End: 2018-11-28

## 2018-11-28 RX ORDER — NITROGLYCERIN 0.4 MG/1
0.4 TABLET SUBLINGUAL ONCE
Status: COMPLETED | OUTPATIENT
Start: 2018-11-28 | End: 2018-11-28

## 2018-11-28 RX ORDER — METOPROLOL TARTRATE 50 MG/1
50 TABLET, FILM COATED ORAL ONCE AS NEEDED
Status: DISCONTINUED | OUTPATIENT
Start: 2018-11-28 | End: 2018-11-28

## 2018-11-28 RX ORDER — ROSUVASTATIN CALCIUM 10 MG/1
10 TABLET, COATED ORAL NIGHTLY
Status: DISCONTINUED | OUTPATIENT
Start: 2018-11-28 | End: 2018-11-28

## 2018-11-28 RX ORDER — OXYBUTYNIN CHLORIDE 5 MG/1
5 TABLET ORAL 2 TIMES DAILY
Status: DISCONTINUED | OUTPATIENT
Start: 2018-11-28 | End: 2018-11-29

## 2018-11-28 RX ORDER — POLYETHYLENE GLYCOL 3350 17 G/17G
17 POWDER, FOR SOLUTION ORAL DAILY PRN
Status: DISCONTINUED | OUTPATIENT
Start: 2018-11-28 | End: 2018-11-29

## 2018-11-28 RX ORDER — DILTIAZEM HYDROCHLORIDE 5 MG/ML
INJECTION INTRAVENOUS
Status: DISCONTINUED
Start: 2018-11-28 | End: 2018-11-28 | Stop reason: WASHOUT

## 2018-11-28 RX ORDER — METOPROLOL TARTRATE 50 MG/1
100 TABLET, FILM COATED ORAL ONCE
Status: DISCONTINUED | OUTPATIENT
Start: 2018-11-28 | End: 2018-11-28

## 2018-11-28 RX ORDER — ASPIRIN 81 MG/1
81 TABLET, CHEWABLE ORAL NIGHTLY
Status: DISCONTINUED | OUTPATIENT
Start: 2018-11-28 | End: 2018-11-29

## 2018-11-28 RX ORDER — ROSUVASTATIN CALCIUM 20 MG/1
20 TABLET, COATED ORAL NIGHTLY
Status: DISCONTINUED | OUTPATIENT
Start: 2018-11-29 | End: 2018-11-29

## 2018-11-28 RX ORDER — ALPRAZOLAM 0.5 MG/1
0.25 TABLET ORAL ONCE AS NEEDED
Status: ACTIVE | OUTPATIENT
Start: 2018-11-28 | End: 2018-11-28

## 2018-11-28 RX ORDER — HEPARIN SODIUM 5000 [USP'U]/ML
5000 INJECTION, SOLUTION INTRAVENOUS; SUBCUTANEOUS EVERY 12 HOURS SCHEDULED
Status: DISCONTINUED | OUTPATIENT
Start: 2018-11-28 | End: 2018-11-29

## 2018-11-28 RX ORDER — METOPROLOL TARTRATE 50 MG/1
100 TABLET, FILM COATED ORAL ONCE
Status: DISCONTINUED | OUTPATIENT
Start: 2018-11-29 | End: 2018-11-28

## 2018-11-28 RX ORDER — METOPROLOL TARTRATE 5 MG/5ML
INJECTION INTRAVENOUS
Status: COMPLETED
Start: 2018-11-28 | End: 2018-11-28

## 2018-11-28 RX ORDER — METOPROLOL TARTRATE 50 MG/1
100 TABLET, FILM COATED ORAL ONCE AS NEEDED
Status: DISCONTINUED | OUTPATIENT
Start: 2018-11-28 | End: 2018-11-28

## 2018-11-28 RX ORDER — METOCLOPRAMIDE HYDROCHLORIDE 5 MG/ML
10 INJECTION INTRAMUSCULAR; INTRAVENOUS EVERY 8 HOURS PRN
Status: DISCONTINUED | OUTPATIENT
Start: 2018-11-28 | End: 2018-11-29

## 2018-11-28 RX ORDER — METOPROLOL TARTRATE 50 MG/1
50 TABLET, FILM COATED ORAL ONCE
Status: DISCONTINUED | OUTPATIENT
Start: 2018-11-29 | End: 2018-11-28

## 2018-11-28 RX ORDER — SODIUM PHOSPHATE, DIBASIC AND SODIUM PHOSPHATE, MONOBASIC 7; 19 G/133ML; G/133ML
1 ENEMA RECTAL ONCE AS NEEDED
Status: DISCONTINUED | OUTPATIENT
Start: 2018-11-28 | End: 2018-11-29

## 2018-11-28 RX ORDER — DOCUSATE SODIUM 100 MG/1
100 CAPSULE, LIQUID FILLED ORAL 2 TIMES DAILY
Status: DISCONTINUED | OUTPATIENT
Start: 2018-11-28 | End: 2018-11-29

## 2018-11-28 RX ORDER — METOPROLOL TARTRATE 50 MG/1
50 TABLET, FILM COATED ORAL ONCE AS NEEDED
Status: DISCONTINUED | OUTPATIENT
Start: 2018-11-29 | End: 2018-11-28

## 2018-11-28 RX ORDER — BISACODYL 10 MG
10 SUPPOSITORY, RECTAL RECTAL
Status: DISCONTINUED | OUTPATIENT
Start: 2018-11-28 | End: 2018-11-29

## 2018-11-28 RX ORDER — ALPRAZOLAM 0.5 MG/1
0.25 TABLET ORAL ONCE AS NEEDED
Status: DISCONTINUED | OUTPATIENT
Start: 2018-11-29 | End: 2018-11-29

## 2018-11-28 RX ORDER — SODIUM CHLORIDE 0.9 % (FLUSH) 0.9 %
3 SYRINGE (ML) INJECTION AS NEEDED
Status: DISCONTINUED | OUTPATIENT
Start: 2018-11-28 | End: 2018-11-29

## 2018-11-28 RX ORDER — METOPROLOL TARTRATE 5 MG/5ML
5 INJECTION INTRAVENOUS SEE ADMIN INSTRUCTIONS
Status: DISCONTINUED | OUTPATIENT
Start: 2018-11-28 | End: 2018-11-28

## 2018-11-28 RX ORDER — METOPROLOL TARTRATE 50 MG/1
50 TABLET, FILM COATED ORAL ONCE
Status: DISCONTINUED | OUTPATIENT
Start: 2018-11-28 | End: 2018-11-28

## 2018-11-28 RX ORDER — ONDANSETRON 2 MG/ML
4 INJECTION INTRAMUSCULAR; INTRAVENOUS EVERY 6 HOURS PRN
Status: DISCONTINUED | OUTPATIENT
Start: 2018-11-28 | End: 2018-11-29

## 2018-11-28 NOTE — CONSULTS
[facility] - [facility]    Report of Consultation    Tonya Pierre Patient Status:  Emergency    1950 MRN [id number]   Location [address] Attending Renetta Choi MD   Hosp Day # 0 PCP Bhaskar Quintana MD     Rg REPLACEMENT         Family History  Family History   Problem Relation Age of Onset   • Kidney Disease Father         Per NG: Renal disease   • Heart Disease Father 80        Per NG: Cause of death        Social History  Social History    Tobacco Use      S deficits  HEENT normal, no pharyngeal erythema or exudates  Neck supple, no masses, no adenopathy  No rash  Chest normal  Heart regular, no murmurs  No labored breathing, lungs clear anteriorly  Abdomen soft, NT, no rebound, no masses  No c/c/e, no open wo fever, no chills, no abdominal or diarrhea. # S/p travel to [facility] in October and [place] in November. Took malaria prophylaxis and has has typhoid vaccination  # Malaise, weakness, chest discomfort with dizziness. Has exertional component.   No

## 2018-11-28 NOTE — IMAGING NOTE
TO ct AT 1605 HX TAKEN PT CONSENTED on floor  /70 hr 42    18 GAUGE IV STARTED ON FLOOR POC TESTING COMPLETED GFR = >60   CREATINE =1.03    CTA ORDERED BY CHANTEL CAMPO WAS PT GIVEN CTA  PREMEDS  NO     TO CT TABLE O2 PLACED AT 2 L NASAL CANNULA HOOKED

## 2018-11-28 NOTE — HISTORICAL OFFICE NOTE
Cristianakbar Khan  : 1950  ACCOUNT:  [id number]  [phone]  PCP: Dr. Jayden Gil     TODAY'S DATE: 2017  DICTATED BY:  [Dr. Burden Fried: [Followup of Coronary atherosclerosis, Followup of Hypercholesteremia and pure.] for premature CAD. Negative for AAA. SOCIAL HISTORY: SMOKING: Never used tobacco. CAFFEINE: rare. ALCOHOL: drinks occasionally. EXERCISE: bicycling, regular, treadmill and very active. DIET: low fat, low fat and ow cholesterol. MARITAL STATUS: . bruit, right  3. Hypercholesteremia, pure    PLAN:  1. Holter monitor to assess burden of supraventricular ectopy. 2. Carotid ultrasound to assess right carotid bruit. 3. Continue with nutrition, exercise as currently doing.    4. Medications will remai

## 2018-11-28 NOTE — CONSULTS
[facility]    Report of Consultation    Haydee Paget Patient Status:  Emergency    1950 MRN [id number]   Location [address] Attending Chris Larson MD   Hosp Day # 0 PCP Jen Muniz MD     Rg and unspecified hyperlipidemia    • Prostate cancer [facility]) 2013    Per NG: [place] Prostate-Seeds implant with Dr. Christian Shaffer     Past Surgical History:   Procedure Laterality Date   • COLONOSCOPY  10/12, 11/17   • COLONOSCOPY N/A 11/8/2017    Performed by Manuel Bass carotid bruit extremities   Lungs clear to auscultation no wheezing no rhonchi no crackles  Cardiovascular exam normal S1-S2 regular rate and rhythm no S3   abdomen soft nontender  Extremities no edema  Skin warm   pulses intact and equal bilaterally both troponins negative recommend stress echocardiogram versus repeat CTA, will discuss with patient's primary cardiologist Dr. Adina Iqbal. If echo abnormal or troponins elevated will then need a cardiac cath.   Continue statin and add low dose ASA if no contrain

## 2018-11-28 NOTE — H&P
Psychiatric    PATIENT'S NAME: Yari Campos   ATTENDING PHYSICIAN: Héctor Russell MD   PATIENT ACCOUNT#:   [de-identified]    LOCATION:  [place] [address]  MEDICAL RECORD #:   [id number]       YOB: 1950  ADMISSION DATE:       11/2 illicit drug use. REVIEW OF SYSTEMS:  The patient denies frequent headaches or ophthalmologic complaints. No dysphagia, hemoptysis, hematemesis, melena, or hematochezia. No dysuria or change in frequency.   No one-sided weakness, heat or cold intoleran

## 2018-11-28 NOTE — ED NOTES
Pt states he was working out and all of a sudden developed lightheadedness, SOB and bilateral hand tingling. Pt states he had a similar episode 2 weeks ago while he was sitting and st that time developed chest pain.  Pt states he feels tired and dizzy at th

## 2018-11-28 NOTE — ED PROVIDER NOTES
Patient Seen in: [facility] AND [facility] Emergency Department    History   Patient presents with:  Dyspnea EMLI SOB (respiratory)    Stated Complaint: Dizzy, chest pain    HPI     70-year-old male presents for evaluation of dizziness.   Patient returned from [facility] reviewed. All other systems reviewed and negative except as noted above.     Physical Exam     ED Triage Vitals   BP 11/28/18 1038 143/79   Pulse 11/28/18 1038 66   Resp 11/28/18 1038 18   Temp 11/28/18 1038 98 °F (36.7 °C)   Temp src --    SpO2 11/28/ Final result                 Please view results for these tests on the individual orders.    MAGNESIUM   RAINBOW DRAW BLUE   RAINBOW DRAW LAVENDER   RAINBOW DRAW DARK GREEN   RAINBOW DRAW LIGHT GREEN   RAINBOW DRAW GOLD   RAINBOW DRAW LA your blood pressure.     Medications Prescribed:  Current Discharge Medication List        Present on Admission  Date Reviewed: 7/13/2018          ICD-10-CM Noted POA    Dyspnea R06.00 11/28/2018 Unknown

## 2018-11-29 ENCOUNTER — APPOINTMENT (OUTPATIENT)
Dept: CV DIAGNOSTICS | Facility: HOSPITAL | Age: 68
End: 2018-11-29
Attending: INTERNAL MEDICINE
Payer: MEDICARE

## 2018-11-29 ENCOUNTER — MYAURORA ACCOUNT LINK (OUTPATIENT)
Dept: OTHER | Age: 68
End: 2018-11-29

## 2018-11-29 ENCOUNTER — APPOINTMENT (OUTPATIENT)
Dept: ULTRASOUND IMAGING | Facility: HOSPITAL | Age: 68
End: 2018-11-29
Attending: INTERNAL MEDICINE
Payer: MEDICARE

## 2018-11-29 VITALS
WEIGHT: 167.38 LBS | OXYGEN SATURATION: 93 % | RESPIRATION RATE: 16 BRPM | HEART RATE: 57 BPM | BODY MASS INDEX: 24.79 KG/M2 | SYSTOLIC BLOOD PRESSURE: 120 MMHG | TEMPERATURE: 98 F | DIASTOLIC BLOOD PRESSURE: 65 MMHG | HEIGHT: 69 IN

## 2018-11-29 PROCEDURE — 93970 EXTREMITY STUDY: CPT | Performed by: INTERNAL MEDICINE

## 2018-11-29 PROCEDURE — 80061 LIPID PANEL: CPT | Performed by: INTERNAL MEDICINE

## 2018-11-29 PROCEDURE — 96372 THER/PROPH/DIAG INJ SC/IM: CPT

## 2018-11-29 PROCEDURE — 93018 CV STRESS TEST I&R ONLY: CPT | Performed by: INTERNAL MEDICINE

## 2018-11-29 PROCEDURE — 93350 STRESS TTE ONLY: CPT | Performed by: INTERNAL MEDICINE

## 2018-11-29 PROCEDURE — 80048 BASIC METABOLIC PNL TOTAL CA: CPT | Performed by: INTERNAL MEDICINE

## 2018-11-29 PROCEDURE — 93016 CV STRESS TEST SUPVJ ONLY: CPT | Performed by: INTERNAL MEDICINE

## 2018-11-29 PROCEDURE — 93017 CV STRESS TEST TRACING ONLY: CPT | Performed by: INTERNAL MEDICINE

## 2018-11-29 RX ORDER — ROSUVASTATIN CALCIUM 20 MG/1
20 TABLET, COATED ORAL NIGHTLY
Qty: 90 TABLET | Refills: 3 | Status: SHIPPED | OUTPATIENT
Start: 2018-11-29

## 2018-11-29 RX ORDER — OXYBUTYNIN CHLORIDE 5 MG/1
TABLET ORAL
Qty: 10 TABLET | Refills: 1 | Status: SHIPPED | OUTPATIENT
Start: 2018-11-29 | End: 2018-11-29

## 2018-11-29 NOTE — PROGRESS NOTES
Patient seen and evaluated. Legs with no edema, tenderness nor warmth/redness  Testing reviewed. CCTA suboptima but there is progression of CAD from 2013. Does not look obstructive but will review after re-process. Impression:  1.  Dyspnea with presyncop

## 2018-11-29 NOTE — PROGRESS NOTES
[facility]    Progress Note    Manohar Kramer Patient Status:  Observation    1950 MRN [id number]   Location [address] [facility] Attending Jayjay Mcqueen MD   Hosp Day # 0 PCP Jarret Mohan MD       Subjective:   Mnaohar Kramer i for input(s): PGLU in the last 168 hours.     Creatinine   Date Value Ref Range Status   11/29/2018 1.01 0.50 - 1.50 mg/dL Final   11/28/2018 1.03 0.50 - 1.50 mg/dL Final   11/20/2018 1.16 0.50 - 1.50 mg/dL Final   12/05/2017 1.15 0.50 - 1.50 mg/dL Final 11/28/2018 235 140 - 400 K/UL Final   11/20/2018 233 140 - 400 K/UL Final   12/05/2017 186 140 - 400 K/UL Final   11/04/2016 194 140 - 400 K/UL Final       Scheduled Meds:   • Oxybutynin Chloride  5 mg Oral BID   • Heparin Sodium (Porcine)  5,000 Units S Dictated by (CST): Pincus Osler, MD on 11/28/2018 at 16:45     Approved by (CST): Pincus Osler, MD on 11/28/2018 at 17:36          Ekg 12-lead    Result Date: 11/28/2018  ECG Report  Interpretation  -------------------------- Marked sinus Medranodann Walker

## 2018-11-29 NOTE — PROGRESS NOTES
[facility] ID PROGRESS NOTE    Javier Ceja Patient Status:  Observation    1950 MRN [id number]   Location [facility] Attending Cyril Beaver MD   Hosp Day # 0 PCP Jonathan Vazquez MD     Subjective:  Awake, went for stress echo this AM and no mod foraminal, L3-4 left mod foraminal & mild-mod diffuse, L2-3 mild-mod diffuse, L1-2 mild diffuse bulging discs     L4-5 left mild paracentral HNP     Dyspnea     Dyspnea, unspecified type      ASSESSMENT:    Antibiotics: OFF    79year old male healthy. PA-C  [facility]  [phone]  11/29/2018

## 2018-11-30 PROBLEM — I25.10 CAD (CORONARY ARTERY DISEASE), NATIVE CORONARY ARTERY: Status: ACTIVE | Noted: 2018-11-30

## 2018-11-30 PROBLEM — R55 POSTURAL DIZZINESS WITH PRESYNCOPE: Status: ACTIVE | Noted: 2018-11-30

## 2018-11-30 PROBLEM — R42 POSTURAL DIZZINESS WITH PRESYNCOPE: Status: ACTIVE | Noted: 2018-11-30

## 2018-11-30 PROBLEM — R20.2 NUMBNESS AND TINGLING: Status: ACTIVE | Noted: 2018-11-30

## 2018-11-30 PROBLEM — R20.0 NUMBNESS AND TINGLING: Status: ACTIVE | Noted: 2018-11-30

## 2018-11-30 PROBLEM — I51.7 CARDIOMEGALY: Status: ACTIVE | Noted: 2018-11-30

## 2018-11-30 NOTE — PROGRESS NOTES
[facility]    Progress Note    Lindi Skiff Patient Status:  Observation    1950 MRN [id number]   Location [address] [facility] Attending No att. providers found   Hosp Day # 0 PCP Yash Alford MD        Subjective:   [facility] out-patient    4. Consider MRI of brain if symptoms persist    5. Back to exercise but more measured and progress as tolerated. CAD native vessels:  Has progression with plaque burden and elevated calcium score (347->1019).   Needed to be more aggressive thickening. 6. Lesser incidental findings as above. A preliminary report was issued by the [address] [place] [address] Radiology teleradiology service. There are no major discrepancies.    Dictated by (CST): Doreen Owens MD on 11/29/2018 at 9:34     Approved by (CST): Femi Cardenas 11/28/2018 10:34:09 heart rate has decreased Electronically signed on 11/28/2018 at 20:11 by Soren Kumari 12-lead    Result Date: 11/28/2018  ECG Report  Interpretation  -------------------------- Sinus Bradycardia -Frequent PACs Prior inferior i

## 2018-12-07 ENCOUNTER — LAB REQUISITION (OUTPATIENT)
Dept: LAB | Facility: HOSPITAL | Age: 68
End: 2018-12-07
Payer: MEDICARE

## 2018-12-07 DIAGNOSIS — Z85.46 PERSONAL HISTORY OF MALIGNANT NEOPLASM OF PROSTATE: ICD-10-CM

## 2018-12-07 DIAGNOSIS — E55.9 VITAMIN D DEFICIENCY: ICD-10-CM

## 2018-12-07 DIAGNOSIS — E78.00 PURE HYPERCHOLESTEROLEMIA: ICD-10-CM

## 2018-12-07 DIAGNOSIS — R51.9 HEADACHE: ICD-10-CM

## 2018-12-07 PROCEDURE — 80061 LIPID PANEL: CPT | Performed by: INTERNAL MEDICINE

## 2018-12-07 PROCEDURE — 82306 VITAMIN D 25 HYDROXY: CPT | Performed by: INTERNAL MEDICINE

## 2018-12-07 PROCEDURE — 86038 ANTINUCLEAR ANTIBODIES: CPT | Performed by: INTERNAL MEDICINE

## 2018-12-07 PROCEDURE — 80053 COMPREHEN METABOLIC PANEL: CPT | Performed by: INTERNAL MEDICINE

## 2018-12-07 PROCEDURE — 82607 VITAMIN B-12: CPT | Performed by: INTERNAL MEDICINE

## 2018-12-07 PROCEDURE — 84153 ASSAY OF PSA TOTAL: CPT | Performed by: INTERNAL MEDICINE

## 2018-12-07 PROCEDURE — 85652 RBC SED RATE AUTOMATED: CPT | Performed by: INTERNAL MEDICINE

## 2018-12-07 PROCEDURE — 85025 COMPLETE CBC W/AUTO DIFF WBC: CPT | Performed by: INTERNAL MEDICINE

## 2018-12-07 PROCEDURE — 84443 ASSAY THYROID STIM HORMONE: CPT | Performed by: INTERNAL MEDICINE

## 2018-12-07 PROCEDURE — 86039 ANTINUCLEAR ANTIBODIES (ANA): CPT | Performed by: INTERNAL MEDICINE

## 2019-01-07 ENCOUNTER — PRIOR ORIGINAL RECORDS (OUTPATIENT)
Dept: OTHER | Age: 69
End: 2019-01-07

## 2019-02-28 VITALS
DIASTOLIC BLOOD PRESSURE: 70 MMHG | BODY MASS INDEX: 25.33 KG/M2 | RESPIRATION RATE: 18 BRPM | HEART RATE: 48 BPM | WEIGHT: 171 LBS | HEIGHT: 69 IN | SYSTOLIC BLOOD PRESSURE: 126 MMHG

## 2019-08-06 ENCOUNTER — TELEPHONE (OUTPATIENT)
Dept: SURGERY | Facility: CLINIC | Age: 69
End: 2019-08-06

## 2019-10-24 ENCOUNTER — TELEPHONE (OUTPATIENT)
Dept: PHYSICAL THERAPY | Facility: HOSPITAL | Age: 69
End: 2019-10-24

## 2019-10-24 ENCOUNTER — HOSPITAL ENCOUNTER (OUTPATIENT)
Dept: GENERAL RADIOLOGY | Facility: HOSPITAL | Age: 69
Discharge: HOME | End: 2019-10-24
Attending: PHYSICAL MEDICINE & REHABILITATION
Payer: MEDICARE

## 2019-10-24 ENCOUNTER — OFFICE VISIT (OUTPATIENT)
Dept: NEUROLOGY | Facility: CLINIC | Age: 69
End: 2019-10-24
Payer: MEDICARE

## 2019-10-24 VITALS
HEIGHT: 69 IN | HEART RATE: 58 BPM | WEIGHT: 168 LBS | DIASTOLIC BLOOD PRESSURE: 62 MMHG | BODY MASS INDEX: 24.88 KG/M2 | RESPIRATION RATE: 16 BRPM | SYSTOLIC BLOOD PRESSURE: 136 MMHG

## 2019-10-24 DIAGNOSIS — M54.12 CERVICAL RADICULOPATHY: Primary | ICD-10-CM

## 2019-10-24 DIAGNOSIS — M54.12 CERVICAL RADICULOPATHY: ICD-10-CM

## 2019-10-24 DIAGNOSIS — M54.2 NECK PAIN ON LEFT SIDE: ICD-10-CM

## 2019-10-24 PROCEDURE — 72052 X-RAY EXAM NECK SPINE 6/>VWS: CPT | Performed by: PHYSICAL MEDICINE & REHABILITATION

## 2019-10-24 PROCEDURE — 99214 OFFICE O/P EST MOD 30 MIN: CPT | Performed by: PHYSICAL MEDICINE & REHABILITATION

## 2019-10-24 RX ORDER — MULTIVIT-MIN/IRON/FOLIC ACID/K 18-600-40
CAPSULE ORAL DAILY
COMMUNITY

## 2019-10-24 RX ORDER — RUTIN/HESP/BIOFLAV/C/HERBAL196 40-25-50MG
TABLET ORAL DAILY
COMMUNITY

## 2019-10-24 NOTE — PATIENT INSTRUCTIONS
As of October 6th 2014, the Drug Enforcement Agency [facility]) is reclassifying all hydrocodone combination medications from Schedule III to Schedule II. This includes medications such as Norco, Vicodin, Lortab, Zohydro, and Vicoprofen.     What this means for y will get a cervical spine x-ray. He will start PT on the cervical spine. He will call me after he has done 2-4 weeks of the PT to let me know how he is doing. If he is not getting better, then He will need to have a MRI of the cervical spine.     H

## 2019-10-24 NOTE — PROGRESS NOTES
Cervical Pain H & P    Chief Complaint:  Patient presents with:  Neck Pain: patient here with chronic neck pain with no known triggers. denies cervical injections/physical therapy related to symptoms. denies imaging.  LOV 10/23/2017 for lbp    Nursing note COLONOSCOPY N/A 11/8/2017    Performed by Jazzy Lopez MD at [address] [place] [address] ENDOSCOPY   • EGD SCOPE  2/15, 2/17    stricture dilated   • ESOPHAGOGASTRODUODENOSCOPY (EGD) N/A 2/3/2017    Performed by Jazzy Lopez MD at [address] [place] [address] sexual activity: Not on file    Other Topics      Concerns:         Service: Not Asked        Blood Transfusions: Not Asked        Caffeine Concern: No        Occupational Exposure: Not Asked        Hobby Hazards: Not Asked        Sleep Concern: No Neurological Upper Extremity:    Light Touch: Intact in Bilateral upper extremities. Pin Prick: Not tested. UE Muscle Strength:  All Upper Extremity strength measurements 5/5 except:  Triceps Left: 4+/5  Shoulder external rotators Right: 4+/5  Shoulde

## 2019-10-26 PROBLEM — M43.22 CERVICAL VERTEBRAL FUSION: Status: ACTIVE | Noted: 2019-10-26

## 2019-10-26 PROBLEM — M50.90 CERVICAL DISC DISEASE: Status: ACTIVE | Noted: 2019-10-26

## 2019-10-28 ENCOUNTER — TELEPHONE (OUTPATIENT)
Dept: NEUROLOGY | Facility: CLINIC | Age: 69
End: 2019-10-28

## 2019-10-28 NOTE — TELEPHONE ENCOUNTER
Patient informed of the imaging results and recommendations per Dr. Jeanine Khalil. Patient verbalized understanding and is scheduled next week to start therapy.

## 2019-10-28 NOTE — TELEPHONE ENCOUNTER
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   Please make sure that he has started the PT.

## 2019-11-04 ENCOUNTER — OFFICE VISIT (OUTPATIENT)
Dept: PHYSICAL THERAPY | Facility: HOSPITAL | Age: 69
End: 2019-11-04
Attending: PHYSICAL MEDICINE & REHABILITATION
Payer: MEDICARE

## 2019-11-04 DIAGNOSIS — M54.12 CERVICAL RADICULOPATHY: ICD-10-CM

## 2019-11-04 DIAGNOSIS — M54.2 NECK PAIN ON LEFT SIDE: ICD-10-CM

## 2019-11-04 PROCEDURE — 97535 SELF CARE MNGMENT TRAINING: CPT

## 2019-11-04 PROCEDURE — 97162 PT EVAL MOD COMPLEX 30 MIN: CPT

## 2019-11-04 PROCEDURE — 97140 MANUAL THERAPY 1/> REGIONS: CPT

## 2019-11-04 NOTE — PROGRESS NOTES
CERVICAL SPINE EVALUATION:   Bhupinder Lewis    4/6/1950  Referring Physician:  Floridalma Carter  Diagnosis: Cervical radiculopathy (M54.12)  Neck pain on left side (M54.2)  Date of Onset: 10/1/2019  Initial Evaluation Date: 11/4/2019  Insurance: [employer] making due to 1-2 personal factors/comorbidities, 3 body structures involved/activity limitations, and evolving symptoms including changing pain levels.      Past Medical History:   Diagnosis Date   • Bone spur    • H/O prostate biopsy 2013   • High cholest 11/4/2019:    FOTO: primary measure 7  = 29% disability with 26% predicted at visit 9. FABQ = 9 where >43-48 = fear avoidance behavior.     Posture:  Head in L tilt, L shoulder elevated, flattened thoracic spine, R lumbar, L thoracic, R cervical scoliosis, foraminal encroachment C5-6 and C6-7. FLEXION/EXTENSION: Limited flexion and extension at the fused C2-C3-C4 levels. No subluxation during flexion and extension.     OTHER:             Negative.                =====  CONCLUSION:   1. Multilevel cervical s lumbar spine, most pronounced within the superior endplates of the L4 and L5   vertebral bodies. There is a hemangioma in the S2 vertebral body. CORD/CAUDA EQUINA:            The conus medullaris terminates at the level of L1.  The distal cord and nerve ro bilateral L5-S1 spondylolysis. Findings in conjunction with degenerative change at this level result in moderate to severe bilateral neural foraminal narrowing. 2. Additional multilevel degenerative changes of the lumbar spine as detailed.  Findings result

## 2019-11-08 ENCOUNTER — OFFICE VISIT (OUTPATIENT)
Dept: PHYSICAL THERAPY | Facility: HOSPITAL | Age: 69
End: 2019-11-08
Attending: PHYSICAL MEDICINE & REHABILITATION
Payer: MEDICARE

## 2019-11-08 PROCEDURE — 97535 SELF CARE MNGMENT TRAINING: CPT

## 2019-11-08 PROCEDURE — 97110 THERAPEUTIC EXERCISES: CPT

## 2019-11-08 PROCEDURE — 97140 MANUAL THERAPY 1/> REGIONS: CPT

## 2019-11-08 NOTE — PROGRESS NOTES
Ginger Hendricks    4/6/1950  Referring Physician:  Javier Carter  Diagnosis: Cervical radiculopathy (M54.12)  Neck pain on left side (M54.2)  Date of Onset: 10/1/2019  Initial Evaluation Date: 11/4/2019  Insurance: Medicare        Through date: 2/2/2020 stretching w self assistance to maintain gains made in tx this date. Pt demonstrates apparent LLD R<L as noted above.   Pt educated on findings with use of mirror to self assess asis, iliac crest and greater trochanters, and imaging to confirm mild scolios skin drag mid post cervical.   Sensation: Intact to light touch of the UE dermatomes.       Abdominals 11/4/2019   Tone good     Diastasis in fingers 11/4/2019   6 cm above umbilicus 1   At umbilicus 2   6 cm below umbilicus 1      UE Neural Glides 11/4/201 malalignment. 2. Developmental fusion of C2-C3-C4. 3. Bilateral foraminal encroachment most pronounced at C5-6 and C6-7.            Dictated by (CST): Jt Mancilla MD on 10/24/2019 at 16:32       Approved by (CST): Jt Mancilla MD on 10/24/2 apparent. Mild right lateral recess narrowing results without significant spinal canal or neural foraminal compromise. L3-L4:   There is a moderate diffuse disc bulge with mild facet arthropathy.  Findings result in mild to moderate left and minimal right

## 2019-11-11 ENCOUNTER — OFFICE VISIT (OUTPATIENT)
Dept: PHYSICAL THERAPY | Facility: HOSPITAL | Age: 69
End: 2019-11-11
Attending: PHYSICAL MEDICINE & REHABILITATION
Payer: MEDICARE

## 2019-11-11 PROCEDURE — 97140 MANUAL THERAPY 1/> REGIONS: CPT

## 2019-11-11 PROCEDURE — 97110 THERAPEUTIC EXERCISES: CPT

## 2019-11-11 PROCEDURE — 97535 SELF CARE MNGMENT TRAINING: CPT

## 2019-11-11 NOTE — PROGRESS NOTES
Bhupinder Lewis    4/6/1950  Referring Physician:  Urbano Carter  Diagnosis: Cervical radiculopathy (M54.12)  Neck pain on left side (M54.2)  Date of Onset: 10/1/2019  Initial Evaluation Date: 11/4/2019  Insurance: Medicare        Through date: 2/2/2020 therapist to discuss. 11/8/2019 - Pelvic bony landmarks - R<L at asis, iliac crest, greater trochanters, psis.            ASSESSMENT     Pt doing well without sharp cervical pain recently  Pt educated on cervical anatomy w instruction to be mindful of pr cervical scoliosis, apparent LLD R<L  Palpation: skin drag mid post cervical.   Sensation: Intact to light touch of the UE dermatomes.       Abdominals 11/4/2019   Tone good     Diastasis in fingers 11/4/2019   6 cm above umbilicus 1   At umbilicus 2   6 cm Multilevel cervical spondylosis. No acute fracture or malalignment. 2. Developmental fusion of C2-C3-C4. 3. Bilateral foraminal encroachment most pronounced at C5-6 and C6-7.            Dictated by (CST): Andrew Pacheco MD on 10/24/2019 at 16:32 eccentric to the right. Mild facet arthropathy is apparent. Mild right lateral recess narrowing results without significant spinal canal or neural foraminal compromise. L3-L4:   There is a moderate diffuse disc bulge with mild facet arthropathy.  Findings

## 2019-11-15 ENCOUNTER — OFFICE VISIT (OUTPATIENT)
Dept: PHYSICAL THERAPY | Facility: HOSPITAL | Age: 69
End: 2019-11-15
Attending: PHYSICAL MEDICINE & REHABILITATION
Payer: MEDICARE

## 2019-11-15 PROCEDURE — 97140 MANUAL THERAPY 1/> REGIONS: CPT

## 2019-11-15 PROCEDURE — 97535 SELF CARE MNGMENT TRAINING: CPT

## 2019-11-15 PROCEDURE — 97110 THERAPEUTIC EXERCISES: CPT

## 2019-11-15 NOTE — PROGRESS NOTES
Ailyn Hernández    4/6/1950  Referring Physician:  Gerson Carter  Diagnosis: Cervical radiculopathy (M54.12)  Neck pain on left side (M54.2)  Date of Onset: 10/1/2019  Initial Evaluation Date: 11/4/2019  Insurance: Medicare        Through date: 2/2/2020 Suboccipital release  X X X     Manual traction  X X fair unwinding w mvmt X     MFR  B scalenes and ps X      MET    C0-1 F & ErotRSBL; C1-2 R rot C5 & C7 ERSL,     B upper and lower, B combo upper/lower w hold and soft diaphragmatic breathing          Deisy Elvia turn over in bed without increased symptoms. 4. Pt will be able to look to the L for safe driving without increased symptoms. 5. Pt will be able to turn head to have a conversation at dinner without increased symptoms.        PLAN OF CARE:        Assess r R 5   T1 Interossei L 5   *pain limiting      UE flexibility 11/4/2019   Pectorals R Min-mod   Pectorals L Min-mod   UT R mod   UT L mod   Scalenes R Mod-max   Scalenes L max       Imaging: PROCEDURE:  XR CERVICAL SPINE COMPLETE W/FLEX+EXT (CPT=72052) spondylolysis. There is preservation of the expected lumbar lordosis. BONES:             No fracture or suspicious osseous lesion is evident. Edematous in place degenerative changes are noted in L5-S1.  Multilevel Schmorl's nodes are seen throughout the casey without spinal canal stenosis.      Dictated by (CST): Ashwini Grayson MD on 7/24/2017 at 8:08       Approved by (CST): Ashwini Grayson MD on 7/24/2017 at 8:18          =====  CONCLUSION:   1. Grade I anterolisthesis of L5 relative to S1, related to bilater

## 2019-11-18 ENCOUNTER — OFFICE VISIT (OUTPATIENT)
Dept: PHYSICAL THERAPY | Facility: HOSPITAL | Age: 69
End: 2019-11-18
Attending: PHYSICAL MEDICINE & REHABILITATION
Payer: MEDICARE

## 2019-11-18 PROCEDURE — 97140 MANUAL THERAPY 1/> REGIONS: CPT

## 2019-11-18 PROCEDURE — 97110 THERAPEUTIC EXERCISES: CPT

## 2019-11-18 NOTE — PROGRESS NOTES
Kerri Duenas    4/6/1950  Referring Physician:  Domonique Carter  Diagnosis: Cervical radiculopathy (M54.12)  Neck pain on left side (M54.2)  Date of Onset: 10/1/2019  Initial Evaluation Date: 11/4/2019  Insurance: Medicare        Through date: 2/2/2020 traction  X fair unwinding w mvmt X  X    MFR  X       MET   C0-1 F & ErotRSBL; C1-2 R rot C5 & C7 ERSL,      B upper and lower, B combo upper/lower w hold and soft diaphragmatic breathing          Joint mobilization    PA glides T1-T10, rotational mobiliz mechanics awareness for prevention of reinjury. 3. Pt will be able to turn over in bed without increased symptoms. 4. Pt will be able to look to the L for safe driving without increased symptoms.   5. Pt will be able to turn head to have a conversation at EPL L 5-   T1 Interossei R 5   T1 Interossei L 5   *pain limiting      UE flexibility 11/4/2019   Pectorals R Min-mod   Pectorals L Min-mod   UT R mod   UT L mod   Scalenes R Mod-max   Scalenes L max       Imaging: PROCEDURE:  XR CERVICAL SPINE COMPLETE bilateral L5-S1 spondylolysis. There is preservation of the expected lumbar lordosis. BONES:             No fracture or suspicious osseous lesion is evident. Edematous in place degenerative changes are noted in L5-S1.  Multilevel Schmorl's nodes are seen t foraminal narrowing without spinal canal stenosis.      Dictated by (CST): Isabelle Stark MD on 7/24/2017 at 8:08       Approved by (CST): Isabelle Stark MD on 7/24/2017 at 8:18          =====  CONCLUSION:   1. Grade I anterolisthesis of L5 relative to S1

## 2019-11-21 ENCOUNTER — OFFICE VISIT (OUTPATIENT)
Dept: PHYSICAL THERAPY | Facility: HOSPITAL | Age: 69
End: 2019-11-21
Attending: PHYSICAL MEDICINE & REHABILITATION
Payer: MEDICARE

## 2019-11-21 PROCEDURE — 97535 SELF CARE MNGMENT TRAINING: CPT

## 2019-11-21 PROCEDURE — 97110 THERAPEUTIC EXERCISES: CPT

## 2019-11-21 NOTE — PROGRESS NOTES
Progress Summary    Pt has attended 6, cancelled 0, and no shown 0 visits in Physical Therapy.       Tonya Pierre    4/6/1950  Referring Physician:  Shahzad Carter  Diagnosis: Cervical radiculopathy (M54.12)  Neck pain on left side (M54.2)  Date of Onset: T joint mobility to compensate for cervical autofusion.  Further care w jt manipulations            Many ROM and strength for assessment     X   Man Tx Suboccipital release  X  X     Manual traction  X  X     MFR         MET  C0-1 F & ErotRSBL; C1-2 R rot C objective data below in tables with initial evaluation data. Pt agreed to transfer to [facility]. Physical Therapy Goals:  From 11/4/2019 to 2/2/2020  - Created w patient input during initial assessment  1.  Pt will be independent in beginning level of HEP 11/4/2019:    FOTO: primary measure 7  = 29% disability with 26% predicted at visit 9. FABQ = 9 where >43-48 = fear avoidance behavior.     Posture:  Head in L tilt, L shoulder elevated, flattened thoracic spine, R lumbar, L thoracic, R cervical scoliosis, with small disc remnant at the C2-3 level. Multilevel cervical spondylosis with bilateral facet arthrosis. PREVERTEBRAL SOFT TISSUES:        Negative. OBLIQUE VIEWS:          Bilateral foraminal encroachment C5-6 and C6-7.   FLEXION/EXTENSION: Limit visible mass. OTHER:             Multiple cortical T2 hyperintense rounded lesions arising from the right kidney are seen. There is nodularity of the lateral limb of the left adrenal gland that measures up to 2.0 cm. LUMBAR DISC LEVELS:  L1-L2:    Tiff Nguyen L4-L5.  3. Endplate edematous degenerative changes at L5-S1.  4. Left adrenal nodule, previously characterized as an adenoma. Right renal cysts. 5. Lesser incidental findings as above.

## 2019-11-25 ENCOUNTER — APPOINTMENT (OUTPATIENT)
Dept: PHYSICAL THERAPY | Facility: HOSPITAL | Age: 69
End: 2019-11-25
Attending: PHYSICAL MEDICINE & REHABILITATION
Payer: MEDICARE

## 2019-11-26 ENCOUNTER — APPOINTMENT (OUTPATIENT)
Dept: PHYSICAL THERAPY | Facility: HOSPITAL | Age: 69
End: 2019-11-26
Attending: PHYSICAL MEDICINE & REHABILITATION
Payer: MEDICARE

## 2019-12-03 ENCOUNTER — LAB REQUISITION (OUTPATIENT)
Dept: LAB | Facility: HOSPITAL | Age: 69
End: 2019-12-03
Payer: MEDICARE

## 2019-12-03 DIAGNOSIS — E78.00 PURE HYPERCHOLESTEROLEMIA, UNSPECIFIED: ICD-10-CM

## 2019-12-03 DIAGNOSIS — N39.0 URINARY TRACT INFECTION, SITE NOT SPECIFIED: ICD-10-CM

## 2019-12-03 DIAGNOSIS — Z85.46 PERSONAL HISTORY OF MALIGNANT NEOPLASM OF PROSTATE: ICD-10-CM

## 2019-12-03 DIAGNOSIS — E55.9 VITAMIN D DEFICIENCY, UNSPECIFIED: ICD-10-CM

## 2019-12-03 LAB
ALBUMIN SERPL-MCNC: 4 G/DL
ALBUMIN/GLOB SERPL: 1.3 {RATIO}
ALP SERPL-CCNC: 81 U/L
ALT SERPL-CCNC: 35 UNITS/L
ANION GAP SERPL CALC-SCNC: 9 MMOL/L
AST SERPL-CCNC: 28 UNITS/L
BILIRUB SERPL-MCNC: 0.8 MG/DL
BUN SERPL-MCNC: 20 MG/DL
BUN/CREAT SERPL: 14.9
CALCIUM SERPL-MCNC: 9 MG/DL
CHLORIDE SERPL-SCNC: 107 MMOL/L
CO2 SERPL-SCNC: 23 MMOL/L
CREAT SERPL-MCNC: 1.34 MG/DL
GLOBULIN SER-MCNC: 3.1 G/DL
GLUCOSE SERPL-MCNC: 89 MG/DL
HCT VFR BLD CALC: 45.5 %
HGB BLD-MCNC: 15.2 G/DL
PLATELET # BLD: 243 K/MCL
POTASSIUM SERPL-SCNC: 3.8 MMOL/L
PROT SERPL-MCNC: 7.1 G/DL
RBC # BLD: 4.96 10*6/UL
SODIUM SERPL-SCNC: 139 MMOL/L
TSH SERPL-ACNC: 2.19 MCUNITS/ML
WBC # BLD: 6 K/MCL

## 2019-12-03 PROCEDURE — 84153 ASSAY OF PSA TOTAL: CPT | Performed by: INTERNAL MEDICINE

## 2019-12-03 PROCEDURE — 81001 URINALYSIS AUTO W/SCOPE: CPT | Performed by: INTERNAL MEDICINE

## 2019-12-03 PROCEDURE — 85025 COMPLETE CBC W/AUTO DIFF WBC: CPT | Performed by: INTERNAL MEDICINE

## 2019-12-03 PROCEDURE — 80061 LIPID PANEL: CPT | Performed by: INTERNAL MEDICINE

## 2019-12-03 PROCEDURE — 82306 VITAMIN D 25 HYDROXY: CPT | Performed by: INTERNAL MEDICINE

## 2019-12-03 PROCEDURE — 84443 ASSAY THYROID STIM HORMONE: CPT | Performed by: INTERNAL MEDICINE

## 2019-12-03 PROCEDURE — 80053 COMPREHEN METABOLIC PANEL: CPT | Performed by: INTERNAL MEDICINE

## 2019-12-06 ENCOUNTER — APPOINTMENT (OUTPATIENT)
Dept: PHYSICAL THERAPY | Facility: HOSPITAL | Age: 69
End: 2019-12-06
Attending: PHYSICAL MEDICINE & REHABILITATION
Payer: MEDICARE

## 2019-12-24 ENCOUNTER — HOSPITAL ENCOUNTER (OUTPATIENT)
Dept: ULTRASOUND IMAGING | Facility: HOSPITAL | Age: 69
Discharge: HOME | End: 2019-12-24
Attending: INTERNAL MEDICINE
Payer: MEDICARE

## 2019-12-24 DIAGNOSIS — N18.30 STAGE 3 CHRONIC KIDNEY DISEASE (HCC): ICD-10-CM

## 2019-12-24 PROCEDURE — 76770 US EXAM ABDO BACK WALL COMP: CPT | Performed by: INTERNAL MEDICINE

## 2019-12-26 ENCOUNTER — OFFICE VISIT (OUTPATIENT)
Dept: ORTHOPEDICS CLINIC | Facility: CLINIC | Age: 69
End: 2019-12-26
Payer: MEDICARE

## 2019-12-26 ENCOUNTER — HOSPITAL ENCOUNTER (OUTPATIENT)
Dept: GENERAL RADIOLOGY | Facility: HOSPITAL | Age: 69
Discharge: HOME | End: 2019-12-26
Attending: ORTHOPAEDIC SURGERY
Payer: MEDICARE

## 2019-12-26 VITALS
WEIGHT: 165 LBS | RESPIRATION RATE: 16 BRPM | HEIGHT: 69 IN | HEART RATE: 54 BPM | OXYGEN SATURATION: 99 % | BODY MASS INDEX: 24.44 KG/M2

## 2019-12-26 DIAGNOSIS — M17.12 PRIMARY OSTEOARTHRITIS OF LEFT KNEE: Primary | ICD-10-CM

## 2019-12-26 DIAGNOSIS — M25.562 LEFT KNEE PAIN, UNSPECIFIED CHRONICITY: ICD-10-CM

## 2019-12-26 PROCEDURE — 99203 OFFICE O/P NEW LOW 30 MIN: CPT | Performed by: ORTHOPAEDIC SURGERY

## 2019-12-26 PROCEDURE — 73564 X-RAY EXAM KNEE 4 OR MORE: CPT | Performed by: ORTHOPAEDIC SURGERY

## 2019-12-27 NOTE — H&P
[facility] New Patient Note    CC: Patient presents with:  Consult: left knee pain that has been going on for a few weeks now. Pain is very intermittent. Pain was increasingly worse 12/25/19. Pain is anterior/medial knee.  Denies radiation and numbnes Medication Sig Dispense Refill   • Bioflavonoid Products (BIOFLEX) Oral Tab Take by mouth daily. • Cholecalciferol (VITAMIN D) 2000 units Oral Cap Take by mouth daily.      • Rosuvastatin Calcium 20 MG Oral Tab Take 1 tablet (20 mg total) by mouth nig stable to varus and valgus stress in full extension and 30 degrees flexion, Lachman's and posterior drawer intact.   Asim's test negative  • Neuromuscular: 5 out of 5 quadriceps strength, sensation intact to light touch in the lower extremity  • Vascula We did discuss the role of advanced imaging and arthroscopic surgery, which I would not recommend given moderate osteoarthritis of the knee.   Advanced imaging is likely to confirm arthritis, possible meniscal degeneration, but is unlikely to change managem

## 2019-12-31 ENCOUNTER — OFFICE VISIT (OUTPATIENT)
Dept: PHYSICAL THERAPY | Facility: HOSPITAL | Age: 69
End: 2019-12-31
Attending: PHYSICAL MEDICINE & REHABILITATION
Payer: MEDICARE

## 2019-12-31 PROCEDURE — 97110 THERAPEUTIC EXERCISES: CPT

## 2019-12-31 PROCEDURE — 97164 PT RE-EVAL EST PLAN CARE: CPT

## 2019-12-31 PROCEDURE — 97140 MANUAL THERAPY 1/> REGIONS: CPT

## 2019-12-31 NOTE — PROGRESS NOTES
Dx:     Cervical radiculopathy (M54.12)  Neck pain on left side (M54.2)     Insurance (Authorized # of Visits):  14 (MEDICARE PART A&B)           Authorizing Physician: Dr. Tonie Kaur MD visit:  1/13/2020  Precautions: high cholesterol, prostate cancer w turn head to have a conversation at dinner without increased symptoms. Plan: Continue manual therapy to address jt restrictions and incorporate corrective exercises to improve head and neck mechanics.    Date: 12/31/2019  TX#: 7/14 (8 additional) Date:

## 2020-01-01 ENCOUNTER — EXTERNAL RECORD (OUTPATIENT)
Dept: OTHER | Age: 70
End: 2020-01-01

## 2020-01-02 ENCOUNTER — OFFICE VISIT (OUTPATIENT)
Dept: PHYSICAL THERAPY | Facility: HOSPITAL | Age: 70
End: 2020-01-02
Attending: PHYSICAL MEDICINE & REHABILITATION
Payer: MEDICARE

## 2020-01-02 PROCEDURE — 97110 THERAPEUTIC EXERCISES: CPT

## 2020-01-02 PROCEDURE — 97140 MANUAL THERAPY 1/> REGIONS: CPT

## 2020-01-03 ENCOUNTER — LAB REQUISITION (OUTPATIENT)
Dept: LAB | Facility: HOSPITAL | Age: 70
End: 2020-01-03
Payer: MEDICARE

## 2020-01-03 DIAGNOSIS — R94.4 ABNORMAL RESULTS OF KIDNEY FUNCTION STUDIES: ICD-10-CM

## 2020-01-03 LAB — CREAT BLD-MCNC: 1.01 MG/DL (ref 0.7–1.3)

## 2020-01-03 PROCEDURE — 82565 ASSAY OF CREATININE: CPT | Performed by: INTERNAL MEDICINE

## 2020-01-03 NOTE — PROGRESS NOTES
Dx:     Cervical radiculopathy (M54.12)  Neck pain on left side (M54.2)     Insurance (Authorized # of Visits):  14 (MEDICARE PART A&B)           Authorizing Physician: Dr. Jeanine Khalil  Next MD visit:  1/13/2020  Precautions: high cholesterol, prostate cancer w TX#:  Date:                 TX#:  Date:    Tx#:    EX  Open book x15 bilat  Seated neck rotation in axis 2x10  Seated CT junction self mobs  -Test and measures taken EX  Seated neck rotation in axis 2x10  Seated CT junction self mobs  Supine neck ro

## 2020-01-07 ENCOUNTER — OFFICE VISIT (OUTPATIENT)
Dept: PHYSICAL THERAPY | Facility: HOSPITAL | Age: 70
End: 2020-01-07
Attending: INTERNAL MEDICINE
Payer: MEDICARE

## 2020-01-07 PROCEDURE — 97110 THERAPEUTIC EXERCISES: CPT

## 2020-01-07 PROCEDURE — 97140 MANUAL THERAPY 1/> REGIONS: CPT

## 2020-01-07 NOTE — PROGRESS NOTES
Dx:     Cervical radiculopathy (M54.12)  Neck pain on left side (M54.2)     Insurance (Authorized # of Visits):  14 (MEDICARE PART A&B)           Authorizing Physician: Dr. Lewis ref.  provider found  Next MD visit:  1/13/2020  Precautions: high cholesterol, p bilat  Seated neck rotation in axis 2x10  Seated CT junction self mobs  -Test and measures taken EX  Seated neck rotation in axis 2x10  Seated CT junction self mobs  Supine neck rotation x20 bilat    EX  Seated neck rotation in axis 2x10  Seated CT junctio

## 2020-01-09 ENCOUNTER — OFFICE VISIT (OUTPATIENT)
Dept: PHYSICAL THERAPY | Facility: HOSPITAL | Age: 70
End: 2020-01-09
Attending: PHYSICAL MEDICINE & REHABILITATION
Payer: MEDICARE

## 2020-01-09 PROCEDURE — 97140 MANUAL THERAPY 1/> REGIONS: CPT

## 2020-01-09 PROCEDURE — 97110 THERAPEUTIC EXERCISES: CPT

## 2020-01-09 NOTE — PROGRESS NOTES
Dx:     Cervical radiculopathy (M54.12)  Neck pain on left side (M54.2)     Insurance (Authorized # of Visits):  14 (MEDICARE PART A&B)           Authorizing Physician: Dr. Cuca Hanson  Next MD visit:  1/13/2020  Precautions: high cholesterol, prostate cancer w TX#: 9/14 (8 additional) Date: 1/9/2020             TX#:  10/14 (8 additional) Date:    Tx#:    EX  Open book x15 bilat  Seated neck rotation in axis 2x10  Seated CT junction self mobs  -Test and measures taken EX  Seated neck rotation in axis 2x10  Seate

## 2020-01-13 ENCOUNTER — OFFICE VISIT (OUTPATIENT)
Dept: PHYSICAL THERAPY | Facility: HOSPITAL | Age: 70
End: 2020-01-13
Attending: PHYSICAL MEDICINE & REHABILITATION
Payer: MEDICARE

## 2020-01-13 ENCOUNTER — OFFICE VISIT (OUTPATIENT)
Dept: NEUROLOGY | Facility: CLINIC | Age: 70
End: 2020-01-13
Payer: MEDICARE

## 2020-01-13 VITALS
WEIGHT: 165 LBS | DIASTOLIC BLOOD PRESSURE: 64 MMHG | HEART RATE: 60 BPM | SYSTOLIC BLOOD PRESSURE: 122 MMHG | RESPIRATION RATE: 16 BRPM | HEIGHT: 69 IN | BODY MASS INDEX: 24.44 KG/M2

## 2020-01-13 DIAGNOSIS — M43.16 SPONDYLOLISTHESIS OF LUMBAR REGION: ICD-10-CM

## 2020-01-13 DIAGNOSIS — M48.061 LUMBAR STENOSIS WITHOUT NEUROGENIC CLAUDICATION: ICD-10-CM

## 2020-01-13 DIAGNOSIS — M48.061 LUMBAR FORAMINAL STENOSIS: ICD-10-CM

## 2020-01-13 DIAGNOSIS — M54.2 NECK PAIN ON LEFT SIDE: ICD-10-CM

## 2020-01-13 DIAGNOSIS — M51.26 LUMBAR HERNIATED DISC: ICD-10-CM

## 2020-01-13 DIAGNOSIS — M43.10 RETROLISTHESIS: ICD-10-CM

## 2020-01-13 DIAGNOSIS — M43.22 CERVICAL VERTEBRAL FUSION: ICD-10-CM

## 2020-01-13 DIAGNOSIS — M54.16 LUMBAR RADICULOPATHY: ICD-10-CM

## 2020-01-13 DIAGNOSIS — M51.9 LUMBAR DISC DISEASE: ICD-10-CM

## 2020-01-13 DIAGNOSIS — M50.90 CERVICAL DISC DISEASE: ICD-10-CM

## 2020-01-13 DIAGNOSIS — M17.12 PRIMARY OSTEOARTHRITIS OF LEFT KNEE: Primary | ICD-10-CM

## 2020-01-13 DIAGNOSIS — M25.562 ACUTE PAIN OF LEFT KNEE: ICD-10-CM

## 2020-01-13 PROCEDURE — 97140 MANUAL THERAPY 1/> REGIONS: CPT

## 2020-01-13 PROCEDURE — 97110 THERAPEUTIC EXERCISES: CPT

## 2020-01-13 PROCEDURE — 99214 OFFICE O/P EST MOD 30 MIN: CPT | Performed by: PHYSICAL MEDICINE & REHABILITATION

## 2020-01-13 NOTE — PROGRESS NOTES
Cervical Pain H & P    Chief Complaint:  Patient presents with:  Neck Pain: LOV: 10/24/19 - Pt notes some improvement in Left sided chronic neck pain.  Pt notes unable to rate pain, issue is w/ ROM which has improved minimally since beginning PT w/ first Na Surgical History   Past Surgical History:   Procedure Laterality Date   • COLONOSCOPY  10/12, 11/17   • COLONOSCOPY N/A 11/8/2017    Performed by Anastasiia Mark MD at [address] [place] [address] ENDOSCOPY   • EGD SCOPE  2/15, 2/17    stricture dilated   • ESOPHAGOGASTRODUODENO on file        Emotionally abused: Not on file        Physically abused: Not on file        Forced sexual activity: Not on file    Other Topics      Concerns:         Service: Not Asked        Blood Transfusions: Not Asked        Caffeine Concern: pulse-RIGHT 2+   Dorsalis pedis pulse-LEFT 2+   Tibialis posterior pulse-RIGHT 2+   Tibialis posterior pulse-LEFT 2+      Neurological Lower Extremity:    Light Touch Sensation: Intact in bilateral Lower Extremities   LE Muscle Strength:  All LE strength me

## 2020-01-13 NOTE — PATIENT INSTRUCTIONS
Plan  The patient will continue with his home exercise program for the cervical spine.     He will start the PT on the left knee and lumbar spine.     The patient does not need any injections at this time.     The patient does not need any pain medications

## 2020-01-13 NOTE — PROGRESS NOTES
Discharge Summary  Pt has attended 5 visits with me (11 total) in Physical Therapy    Dx:     Cervical radiculopathy (M54.12)  Neck pain on left side (M54.2)     Insurance (Authorized # of Visits):  14 (MEDICARE PART A&B)           Authorizing Physician: GILMER as soon as possible to [phone]. If you have any questions, please contact me at Dept: [phone].     Sincerely,  Electronically signed by therapist: Josefa Mcallister PT    [de-identified] certification required: Yes  I certify the need for these services

## 2020-01-16 ENCOUNTER — OFFICE VISIT (OUTPATIENT)
Dept: PHYSICAL THERAPY | Facility: HOSPITAL | Age: 70
End: 2020-01-16
Attending: PHYSICAL MEDICINE & REHABILITATION
Payer: MEDICARE

## 2020-01-16 PROCEDURE — 97140 MANUAL THERAPY 1/> REGIONS: CPT

## 2020-01-16 PROCEDURE — 97110 THERAPEUTIC EXERCISES: CPT

## 2020-01-16 NOTE — PROGRESS NOTES
KNEE EVALUATION  Dx:     L knee pain,  bilat knee OA     Insurance (Authorized # of Visits):  14 (MEDICARE PART A&B)           Authorizing Physician: Dr. Sheriff Sic  Next MD visit: none scheduled.  Precautions: high cholesterol, prostate cancer w seeds, R THR 20 or a total of 3 visits over a 90 day period. Treatment will include: manual therapy to address jt restrictions, therex to improve functional L knee and ankle mobility, body mechanics instruction for glut activation.        Patient was advised of these findi

## 2020-01-20 ENCOUNTER — OFFICE VISIT (OUTPATIENT)
Dept: PHYSICAL THERAPY | Facility: HOSPITAL | Age: 70
End: 2020-01-20
Attending: INTERNAL MEDICINE
Payer: MEDICARE

## 2020-01-20 PROCEDURE — 97110 THERAPEUTIC EXERCISES: CPT

## 2020-01-20 PROCEDURE — 97140 MANUAL THERAPY 1/> REGIONS: CPT

## 2020-01-20 NOTE — PROGRESS NOTES
Dx:     L knee pain,  bilat knee OA     Insurance (Authorized # of Visits):  14 (MEDICARE PART A&B)           Authorizing Physician: Dr. Lewis ref. provider found  Next MD visit: none scheduled.  Precautions: high cholesterol, prostate cancer w bryson, R THR MEASURES TAKEN  -self L ankle mobs DF x30  -Self L knee ext mobs x1 min  -Quadruped rocking knee flex mobs with towel roll x2 min  -knee unloading shuttle 25# x5 min  -squatting mechanics instruction x5 min EX  -self L ankle mobs DF x30  -SL HR gastroc and

## 2020-01-23 PROCEDURE — 88305 TISSUE EXAM BY PATHOLOGIST: CPT | Performed by: INTERNAL MEDICINE

## 2020-01-24 ENCOUNTER — OFFICE VISIT (OUTPATIENT)
Dept: PHYSICAL THERAPY | Facility: HOSPITAL | Age: 70
End: 2020-01-24
Attending: PHYSICAL MEDICINE & REHABILITATION
Payer: MEDICARE

## 2020-01-24 PROCEDURE — 97140 MANUAL THERAPY 1/> REGIONS: CPT

## 2020-01-24 PROCEDURE — 97110 THERAPEUTIC EXERCISES: CPT

## 2020-01-27 PROBLEM — I25.10 CAD (CORONARY ARTERY DISEASE): Status: ACTIVE | Noted: 2020-01-27

## 2020-01-27 PROBLEM — E78.5 HYPERLIPIDEMIA LDL GOAL <70: Status: ACTIVE | Noted: 2020-01-27

## 2020-01-27 PROBLEM — I49.1 PAC (PREMATURE ATRIAL CONTRACTION): Status: ACTIVE | Noted: 2020-01-27

## 2020-01-31 ENCOUNTER — APPOINTMENT (OUTPATIENT)
Dept: PHYSICAL THERAPY | Facility: HOSPITAL | Age: 70
End: 2020-01-31
Attending: INTERNAL MEDICINE
Payer: MEDICARE

## 2020-02-25 NOTE — PROGRESS NOTES
Discharge Summary  Pt has attended 14 visits in Physical Therapy  Dx:     L knee pain,  bilat knee OA     Insurance (Authorized # of Visits):  14 (MEDICARE PART A&B)           Authorizing Physician: Dr. Pearl Elizalde  Next MD visit: none scheduled.  Precautions: hi Please co-sign or sign and return this letter via fax as soon as possible to [phone]. If you have any questions, please contact me at Dept: [phone].     Sincerely,  Electronically signed by therapist: Ying Mg, PT    Physician's certification

## 2020-04-07 ENCOUNTER — HOSPITAL (OUTPATIENT)
Dept: OTHER | Age: 70
End: 2020-04-07
Attending: ANESTHESIOLOGY

## 2020-06-08 ENCOUNTER — TELEPHONE (OUTPATIENT)
Dept: CARDIOLOGY | Age: 70
End: 2020-06-08

## 2020-06-08 RX ORDER — ROSUVASTATIN CALCIUM 20 MG/1
TABLET, COATED ORAL
COMMUNITY
Start: 2020-06-05 | End: 2020-06-08 | Stop reason: SDUPTHER

## 2020-06-08 RX ORDER — ROSUVASTATIN CALCIUM 20 MG/1
20 TABLET, COATED ORAL DAILY
Qty: 90 TABLET | Refills: 1 | Status: SHIPPED | OUTPATIENT
Start: 2020-06-08 | End: 2020-06-23 | Stop reason: SDUPTHER

## 2020-06-08 RX ORDER — ASPIRIN 81 MG/1
81 TABLET ORAL DAILY
Status: SHIPPED | COMMUNITY
Start: 2020-06-08

## 2020-06-22 ENCOUNTER — TELEPHONE (OUTPATIENT)
Dept: CARDIOLOGY | Age: 70
End: 2020-06-22

## 2020-06-23 ENCOUNTER — TELEPHONE (OUTPATIENT)
Dept: CARDIOLOGY | Age: 70
End: 2020-06-23

## 2020-06-23 DIAGNOSIS — I25.10 ATHEROSCLEROSIS OF CORONARY ARTERY WITHOUT ANGINA PECTORIS, UNSPECIFIED VESSEL OR LESION TYPE, UNSPECIFIED WHETHER NATIVE OR TRANSPLANTED HEART: ICD-10-CM

## 2020-06-23 DIAGNOSIS — E78.5 HYPERLIPIDEMIA, UNSPECIFIED HYPERLIPIDEMIA TYPE: Primary | ICD-10-CM

## 2020-06-23 DIAGNOSIS — R94.31 ABNORMAL ELECTROCARDIOGRAM (ECG) (EKG): ICD-10-CM

## 2020-06-23 RX ORDER — ROSUVASTATIN CALCIUM 20 MG/1
20 TABLET, COATED ORAL DAILY
Qty: 90 TABLET | Refills: 1 | Status: SHIPPED | OUTPATIENT
Start: 2020-06-23 | End: 2020-07-13 | Stop reason: SDUPTHER

## 2020-06-24 RX ORDER — ROSUVASTATIN CALCIUM 20 MG/1
20 TABLET, COATED ORAL DAILY
Qty: 90 TABLET | Refills: 3 | Status: SHIPPED | OUTPATIENT
Start: 2020-06-24

## 2020-07-06 ENCOUNTER — LAB ENCOUNTER (OUTPATIENT)
Dept: LAB | Facility: HOSPITAL | Age: 70
End: 2020-07-06
Attending: INTERNAL MEDICINE
Payer: MEDICARE

## 2020-07-06 DIAGNOSIS — I25.10 CORONARY ATHEROSCLEROSIS OF NATIVE CORONARY ARTERY: ICD-10-CM

## 2020-07-06 DIAGNOSIS — N18.30 CHRONIC KIDNEY DISEASE, STAGE III (MODERATE) (HCC): Primary | ICD-10-CM

## 2020-07-06 DIAGNOSIS — E78.5 HYPERLIPIDEMIA: ICD-10-CM

## 2020-07-06 LAB
CHOLEST SERPL-MCNC: 173 MG/DL
CHOLEST SMN-MCNC: 173 MG/DL (ref ?–200)
CREAT BLD-MCNC: 1.14 MG/DL (ref 0.7–1.3)
HDLC SERPL-MCNC: 63 MG/DL
HDLC SERPL-MCNC: 63 MG/DL (ref 40–59)
LDLC SERPL CALC-MCNC: 92 MG/DL
LDLC SERPL CALC-MCNC: 92 MG/DL (ref ?–100)
NONHDLC SERPL-MCNC: 110 MG/DL
NONHDLC SERPL-MCNC: 110 MG/DL (ref ?–130)
PATIENT FASTING Y/N/NP: YES
TRIGL SERPL-MCNC: 88 MG/DL
TRIGL SERPL-MCNC: 88 MG/DL (ref 30–149)
VLDLC SERPL CALC-MCNC: 18 MG/DL (ref 0–30)

## 2020-07-06 PROCEDURE — 80061 LIPID PANEL: CPT

## 2020-07-06 PROCEDURE — 36415 COLL VENOUS BLD VENIPUNCTURE: CPT

## 2020-07-06 PROCEDURE — 82565 ASSAY OF CREATININE: CPT

## 2020-07-07 ENCOUNTER — CLINICAL ABSTRACT (OUTPATIENT)
Dept: CARDIOLOGY | Age: 70
End: 2020-07-07

## 2020-07-08 ENCOUNTER — TELEPHONE (OUTPATIENT)
Dept: CARDIOLOGY | Age: 70
End: 2020-07-08

## 2020-07-13 RX ORDER — MULTIVIT-MIN/IRON/FOLIC ACID/K 18-600-40
1 CAPSULE ORAL DAILY
COMMUNITY

## 2020-07-17 ENCOUNTER — OFFICE VISIT (OUTPATIENT)
Dept: CARDIOLOGY | Age: 70
End: 2020-07-17

## 2020-07-17 VITALS
OXYGEN SATURATION: 98 % | BODY MASS INDEX: 24.73 KG/M2 | HEIGHT: 69 IN | HEART RATE: 60 BPM | DIASTOLIC BLOOD PRESSURE: 68 MMHG | SYSTOLIC BLOOD PRESSURE: 120 MMHG | WEIGHT: 167 LBS

## 2020-07-17 DIAGNOSIS — I25.10 CORONARY ARTERY DISEASE INVOLVING NATIVE CORONARY ARTERY OF NATIVE HEART WITHOUT ANGINA PECTORIS: ICD-10-CM

## 2020-07-17 DIAGNOSIS — E78.5 HYPERLIPIDEMIA LDL GOAL <70: Primary | ICD-10-CM

## 2020-07-17 PROCEDURE — 99214 OFFICE O/P EST MOD 30 MIN: CPT | Performed by: INTERNAL MEDICINE

## 2020-07-17 RX ORDER — EZETIMIBE 10 MG/1
10 TABLET ORAL DAILY
Qty: 90 TABLET | Refills: 4 | Status: SHIPPED | OUTPATIENT
Start: 2020-07-17 | End: 2020-07-17 | Stop reason: SDUPTHER

## 2020-07-17 RX ORDER — RUTIN/HESP/BIOFLAV/C/HERBAL196 40-25-50MG
1 TABLET ORAL DAILY
COMMUNITY

## 2020-07-17 RX ORDER — EZETIMIBE 10 MG/1
10 TABLET ORAL DAILY
COMMUNITY
End: 2020-07-17 | Stop reason: SDUPTHER

## 2020-07-17 RX ORDER — EZETIMIBE 10 MG/1
10 TABLET ORAL DAILY
Qty: 90 TABLET | Refills: 4 | Status: SHIPPED | OUTPATIENT
Start: 2020-07-17 | End: 2021-06-29

## 2020-07-17 ASSESSMENT — PATIENT HEALTH QUESTIONNAIRE - PHQ9
2. FEELING DOWN, DEPRESSED OR HOPELESS: NOT AT ALL
CLINICAL INTERPRETATION OF PHQ9 SCORE: NO FURTHER SCREENING NEEDED
SUM OF ALL RESPONSES TO PHQ9 QUESTIONS 1 AND 2: 0
1. LITTLE INTEREST OR PLEASURE IN DOING THINGS: NOT AT ALL
CLINICAL INTERPRETATION OF PHQ2 SCORE: NO FURTHER SCREENING NEEDED
SUM OF ALL RESPONSES TO PHQ9 QUESTIONS 1 AND 2: 0

## 2020-07-27 ENCOUNTER — LAB ENCOUNTER (OUTPATIENT)
Dept: LAB | Facility: HOSPITAL | Age: 70
End: 2020-07-27
Attending: INTERNAL MEDICINE
Payer: MEDICARE

## 2020-07-27 ENCOUNTER — ORDER TRANSCRIPTION (OUTPATIENT)
Dept: ADMINISTRATIVE | Facility: HOSPITAL | Age: 70
End: 2020-07-27

## 2020-07-27 DIAGNOSIS — Z01.818 OTHER SPECIFIED PRE-OPERATIVE EXAMINATION: Primary | ICD-10-CM

## 2020-07-27 DIAGNOSIS — Z01.818 OTHER SPECIFIED PRE-OPERATIVE EXAMINATION: ICD-10-CM

## 2020-07-27 DIAGNOSIS — Z11.59 ENCOUNTER FOR SCREENING FOR OTHER VIRAL DISEASES: ICD-10-CM

## 2020-07-27 LAB
SARS-COV-2 RNA RESP QL NAA+PROBE: NOT DETECTED
SARS-COV-2 RNA SPEC QL NAA+PROBE: NOT DETECTED
SPECIMEN SOURCE: NORMAL

## 2020-07-28 ENCOUNTER — HOSPITAL ENCOUNTER (OUTPATIENT)
Dept: CV DIAGNOSTICS | Facility: HOSPITAL | Age: 70
Discharge: HOME | End: 2020-07-28
Attending: INTERNAL MEDICINE
Payer: MEDICARE

## 2020-07-28 ENCOUNTER — TELEPHONE (OUTPATIENT)
Dept: CARDIOLOGY | Age: 70
End: 2020-07-28

## 2020-07-28 DIAGNOSIS — Z91.89 CHRONIC CHEST PAIN WITH HIGH RISK FOR CAD: ICD-10-CM

## 2020-07-28 DIAGNOSIS — G89.29 CHRONIC CHEST PAIN WITH HIGH RISK FOR CAD: ICD-10-CM

## 2020-07-28 DIAGNOSIS — R07.9 CHRONIC CHEST PAIN WITH HIGH RISK FOR CAD: ICD-10-CM

## 2020-07-28 PROCEDURE — 93350 STRESS TTE ONLY: CPT | Performed by: INTERNAL MEDICINE

## 2020-07-28 PROCEDURE — 93016 CV STRESS TEST SUPVJ ONLY: CPT | Performed by: INTERNAL MEDICINE

## 2020-07-28 PROCEDURE — 93017 CV STRESS TEST TRACING ONLY: CPT | Performed by: INTERNAL MEDICINE

## 2020-07-28 PROCEDURE — 93018 CV STRESS TEST I&R ONLY: CPT | Performed by: INTERNAL MEDICINE

## 2020-08-10 ENCOUNTER — TELEPHONE (OUTPATIENT)
Dept: CARDIOLOGY | Age: 70
End: 2020-08-10

## 2020-08-10 DIAGNOSIS — I47.20 VENTRICULAR TACHYCARDIA (CMD): Primary | ICD-10-CM

## 2020-08-11 ENCOUNTER — TELEPHONE (OUTPATIENT)
Dept: CARDIOLOGY | Age: 70
End: 2020-08-11

## 2020-08-17 ENCOUNTER — TELEPHONE (OUTPATIENT)
Dept: CARDIOLOGY | Age: 70
End: 2020-08-17

## 2020-08-21 ENCOUNTER — TELEPHONE (OUTPATIENT)
Dept: CARDIOLOGY | Age: 70
End: 2020-08-21

## 2020-12-03 ENCOUNTER — OFFICE VISIT (OUTPATIENT)
Dept: NEUROLOGY | Facility: CLINIC | Age: 70
End: 2020-12-03
Payer: MEDICARE

## 2020-12-03 ENCOUNTER — TELEPHONE (OUTPATIENT)
Dept: PHYSICAL THERAPY | Age: 70
End: 2020-12-03

## 2020-12-03 ENCOUNTER — TELEPHONE (OUTPATIENT)
Dept: PHYSICAL THERAPY | Facility: HOSPITAL | Age: 70
End: 2020-12-03

## 2020-12-03 DIAGNOSIS — M76.52 PATELLAR TENDONITIS OF LEFT KNEE: ICD-10-CM

## 2020-12-03 DIAGNOSIS — M17.12 PRIMARY OSTEOARTHRITIS OF LEFT KNEE: Primary | ICD-10-CM

## 2020-12-03 DIAGNOSIS — M51.9 LUMBAR DISC DISEASE: ICD-10-CM

## 2020-12-03 DIAGNOSIS — M43.10 RETROLISTHESIS: ICD-10-CM

## 2020-12-03 DIAGNOSIS — M43.16 SPONDYLOLISTHESIS OF LUMBAR REGION: ICD-10-CM

## 2020-12-03 DIAGNOSIS — M51.26 LUMBAR HERNIATED DISC: ICD-10-CM

## 2020-12-03 DIAGNOSIS — M48.061 LUMBAR FORAMINAL STENOSIS: ICD-10-CM

## 2020-12-03 DIAGNOSIS — M76.32 ILIOTIBIAL BAND SYNDROME OF LEFT SIDE: ICD-10-CM

## 2020-12-03 DIAGNOSIS — M48.061 LUMBAR STENOSIS WITHOUT NEUROGENIC CLAUDICATION: ICD-10-CM

## 2020-12-03 PROCEDURE — 99215 OFFICE O/P EST HI 40 MIN: CPT | Performed by: PHYSICAL MEDICINE & REHABILITATION

## 2020-12-03 RX ORDER — EZETIMIBE 10 MG/1
10 TABLET ORAL DAILY
COMMUNITY
Start: 2020-07-15

## 2020-12-03 NOTE — PROGRESS NOTES
Low Back Pain H & P    Chief Complaint: Patient presents with:  Musculoskeletal Problem: Patient here for left knee pain intermiitent described as ache. Pain worsens with walking and exercises.  Patient having new pain to \"patella attachement\" 5/10 at wor Surgical History:   Procedure Laterality Date   • COLONOSCOPY  10/12, 11/17   • COLONOSCOPY  01/2020   • COLONOSCOPY N/A 11/8/2017    Performed by Rosemarie Resendiz MD at [address] [place] [address] ENDOSCOPY   • EGD  01/2020    w dilation to 46 Fr   • EGD SCOPE  2/15, 2/17    st Fear of current or ex partner: Not on file        Emotionally abused: Not on file        Physically abused: Not on file        Forced sexual activity: Not on file    Other Topics      Concerns:         Service: Not Asked        Blood Transfusions: Non-tender for bilateral Greater Trochanteric Bursa     Vascular lower extremity:   Dorsalis pedis pulse-RIGHT 2+   Dorsalis pedis pulse-LEFT 2+   Tibialis posterior pulse-RIGHT 2+   Tibialis posterior pulse-LEFT 2+     Neurological Lower Extremity:    Lig not help. He will continue with the patellar tendon brace for the next 2-4 weeks and then will try to discontinue the use of it. He will follow up in April.     The total office visit face-to-face visit time was at least 40 minutes with over half of t

## 2020-12-03 NOTE — PATIENT INSTRUCTIONS
Plan  I instructed him in an home exercise program for ITB and quadriceps stretching and VMO strengthening. He will try to do a few sessions of PT with Nicol Fung before he leaves for [place] in January. He will use the Aleve as needed.     I spoke to hi

## 2020-12-04 ENCOUNTER — LAB ENCOUNTER (OUTPATIENT)
Dept: LAB | Facility: HOSPITAL | Age: 70
End: 2020-12-04
Attending: INTERNAL MEDICINE
Payer: MEDICARE

## 2020-12-04 DIAGNOSIS — E78.00 PURE HYPERCHOLESTEROLEMIA: Primary | ICD-10-CM

## 2020-12-04 DIAGNOSIS — N18.30 CHRONIC KIDNEY DISEASE, STAGE III (MODERATE) (HCC): ICD-10-CM

## 2020-12-04 DIAGNOSIS — E55.9 VITAMIN D DEFICIENCY: ICD-10-CM

## 2020-12-04 DIAGNOSIS — Z85.46 PERSONAL HISTORY OF MALIGNANT NEOPLASM OF PROSTATE: ICD-10-CM

## 2020-12-04 LAB
25(OH)D3+25(OH)D2 SERPL-MCNC: 41 NG/ML
ABSOLUTE IMMATURE GRANULOCYTES (OFFPRE24): NORMAL
ABSOLUTE NRBC (AUTO): NORMAL
ALBUMIN SERPL-MCNC: 3.9 G/DL
ALBUMIN SERPL-MCNC: 3.9 G/DL
ALBUMIN/GLOB SERPL: NORMAL {RATIO}
ALBUMIN/GLOB SERPL: NORMAL {RATIO}
ALP SERPL-CCNC: NORMAL U/L
ALP SERPL-CCNC: NORMAL U/L
ALT SERPL-CCNC: 51 UNITS/L
ALT SERPL-CCNC: 51 UNITS/L
ANION GAP SERPL CALC-SCNC: NORMAL MMOL/L
ANION GAP SERPL CALC-SCNC: NORMAL MMOL/L
AST SERPL-CCNC: 32 UNITS/L
AST SERPL-CCNC: 32 UNITS/L
BASO+EOS+MONOS # BLD: NORMAL 10*3/UL
BASO+EOS+MONOS NFR BLD: NORMAL %
BASOPHILS # BLD: NORMAL 10*3/UL
BASOPHILS NFR BLD: NORMAL %
BILIRUB SERPL-MCNC: 0.9 MG/DL
BILIRUB SERPL-MCNC: 0.9 MG/DL
BUN SERPL-MCNC: 19 MG/DL
BUN SERPL-MCNC: 19 MG/DL
BUN/CREAT SERPL: NORMAL
BUN/CREAT SERPL: NORMAL
CALCIUM SERPL-MCNC: 9.2 MG/DL
CALCIUM SERPL-MCNC: 9.2 MG/DL
CALCIUM, CORRECTED: NORMAL
CALCIUM, CORRECTED: NORMAL
CHLORIDE SERPL-SCNC: 107 MMOL/L
CHLORIDE SERPL-SCNC: 107 MMOL/L
CHOLEST SERPL-MCNC: 133 MG/DL
CHOLEST/HDLC SERPL: NORMAL {RATIO}
CO2 SERPL-SCNC: NORMAL MMOL/L
CO2 SERPL-SCNC: NORMAL MMOL/L
CREAT SERPL-MCNC: 1.17 MG/DL
CREAT SERPL-MCNC: 1.17 MG/DL
DIFFERENTIAL METHOD BLD: NORMAL
EOSINOPHIL # BLD: NORMAL 10*3/UL
EOSINOPHIL NFR BLD: NORMAL %
ERYTHROCYTE [DISTWIDTH] IN BLOOD BY AUTOMATED COUNT: NORMAL %
ERYTHROCYTE [DISTWIDTH] IN BLOOD: NORMAL %
GLOBULIN SER-MCNC: 3.3 G/DL
GLOBULIN SER-MCNC: 3.3 G/DL
GLUCOSE SERPL-MCNC: 93 MG/DL
GLUCOSE SERPL-MCNC: 93 MG/DL
HCT CALC (HGB X3) (OFFPRE23): NORMAL
HCT VFR BLD CALC: 44.4 %
HDLC SERPL-MCNC: 58 MG/DL
HGB BLD-MCNC: 14.9 G/DL
IMMATURE GRANULOCYTES (OFFPRE25): NORMAL
LDL/HDL (OFFPRE3): NORMAL
LDLC SERPL CALC-MCNC: 58 MG/DL
LENGTH OF FAST TIME PATIENT: NORMAL H
LYMPHOCYTES # BLD: NORMAL 10*3/UL
LYMPHOCYTES NFR BLD: NORMAL %
MCH RBC QN AUTO: 30.2 PG
MCHC RBC AUTO-ENTMCNC: 33.6 G/DL
MCV RBC AUTO: 89.9 FL
MONOCYTES # BLD: NORMAL 10*3/UL
MONOCYTES NFR BLD: NORMAL %
MPV (OFFPRE2): NORMAL
NEUTROPHILS # BLD: NORMAL 10*3/UL
NEUTROPHILS NFR BLD: NORMAL %
NONHDLC SERPL-MCNC: NORMAL MG/DL
NRBC BLD MANUAL-RTO: NORMAL %
PLAT MORPH BLD: NORMAL
PLATELET # BLD: 198 K/MCL
POTASSIUM SERPL-SCNC: 4.2 MMOL/L
POTASSIUM SERPL-SCNC: 4.2 MMOL/L
PROT SERPL-MCNC: 7.2 G/DL
PROT SERPL-MCNC: 7.2 G/DL
RBC # BLD: 4.94 10*6/UL
RBC MORPH BLD: NORMAL
SODIUM SERPL-SCNC: 141 MMOL/L
SODIUM SERPL-SCNC: 141 MMOL/L
TRIG/HDL: NORMAL
TRIGL SERPL-MCNC: 84 MG/DL
TSH SERPL-ACNC: 2.54 MCUNITS/ML
VLDLC SERPL CALC-MCNC: NORMAL MG/DL
WBC # BLD: 6.2 K/MCL
WBC MORPH BLD: NORMAL

## 2020-12-04 PROCEDURE — 36415 COLL VENOUS BLD VENIPUNCTURE: CPT

## 2020-12-04 PROCEDURE — 82306 VITAMIN D 25 HYDROXY: CPT

## 2020-12-04 PROCEDURE — 81003 URINALYSIS AUTO W/O SCOPE: CPT

## 2020-12-04 PROCEDURE — 85025 COMPLETE CBC W/AUTO DIFF WBC: CPT

## 2020-12-04 PROCEDURE — 84153 ASSAY OF PSA TOTAL: CPT

## 2020-12-04 PROCEDURE — 80053 COMPREHEN METABOLIC PANEL: CPT

## 2020-12-04 PROCEDURE — 80061 LIPID PANEL: CPT

## 2020-12-04 PROCEDURE — 84443 ASSAY THYROID STIM HORMONE: CPT

## 2020-12-07 ENCOUNTER — OFFICE VISIT (OUTPATIENT)
Dept: PHYSICAL THERAPY | Facility: HOSPITAL | Age: 70
End: 2020-12-07
Attending: PHYSICAL MEDICINE & REHABILITATION
Payer: MEDICARE

## 2020-12-07 ENCOUNTER — CLINICAL ABSTRACT (OUTPATIENT)
Dept: CARDIOLOGY | Age: 70
End: 2020-12-07

## 2020-12-07 DIAGNOSIS — M76.52 PATELLAR TENDONITIS OF LEFT KNEE: ICD-10-CM

## 2020-12-07 DIAGNOSIS — M17.12 PRIMARY OSTEOARTHRITIS OF LEFT KNEE: ICD-10-CM

## 2020-12-07 DIAGNOSIS — M76.32 ILIOTIBIAL BAND SYNDROME OF LEFT SIDE: ICD-10-CM

## 2020-12-07 PROCEDURE — 97162 PT EVAL MOD COMPLEX 30 MIN: CPT

## 2020-12-07 PROCEDURE — 97110 THERAPEUTIC EXERCISES: CPT

## 2020-12-07 PROCEDURE — 97140 MANUAL THERAPY 1/> REGIONS: CPT

## 2020-12-07 NOTE — PROGRESS NOTES
LOWER EXTREMITY EVALUATION:   Referring Physician: Dr. Dianelys Ramesh  Diagnosis:   Primary osteoarthritis of left knee (M17.12)  Patellar tendonitis of left knee (M76.52)  Iliotibial band syndrome of left side (M76.32)    Date of Service: 12/7/2020     PATIENT replacement         ASSESSMENT  Fazal Marrero presents to physical therapy evaluation with primary c/o L insertional patellar tendon and L lateral knee pain.  The results of the objective tests and measures show proximal hip weakness and impaired motor control of An rotation L femur, knee flexed at heel strike. Today’s Treatment and Response:   Pt education was provided on exam findings, treatment diagnosis, treatment plan, expectations, and prognosis.  Pt was also provided recommendations for activity modifications soon as possible to [phone].  If you have any questions, please contact me at Dept: [phone]    Sincerely,  Electronically signed by therapist: Anay Michael PT  Physician's certification required: Yes  I certify the need for these services sabina

## 2020-12-08 ENCOUNTER — OFFICE VISIT (OUTPATIENT)
Dept: PODIATRY CLINIC | Facility: CLINIC | Age: 70
End: 2020-12-08
Payer: MEDICARE

## 2020-12-08 ENCOUNTER — TELEPHONE (OUTPATIENT)
Dept: PHYSICAL THERAPY | Age: 70
End: 2020-12-08

## 2020-12-08 ENCOUNTER — TELEPHONE (OUTPATIENT)
Dept: CARDIOLOGY | Age: 70
End: 2020-12-08

## 2020-12-08 ENCOUNTER — CLINICAL ABSTRACT (OUTPATIENT)
Dept: CARDIOLOGY | Age: 70
End: 2020-12-08

## 2020-12-08 DIAGNOSIS — M79.671 RIGHT FOOT PAIN: Primary | ICD-10-CM

## 2020-12-08 DIAGNOSIS — M20.41 HAMMER TOE OF RIGHT FOOT: ICD-10-CM

## 2020-12-08 PROCEDURE — G0463 HOSPITAL OUTPT CLINIC VISIT: HCPCS | Performed by: PODIATRIST

## 2020-12-08 PROCEDURE — 99203 OFFICE O/P NEW LOW 30 MIN: CPT | Performed by: PODIATRIST

## 2020-12-08 NOTE — PROGRESS NOTES
HPI:    Patient ID: Azul Machado is a 78year old male. 53-year-old male presents to the office today having not been seen in is much as 6 to 7 years. He has had a new complaint over the last 6 to 8 weeks.   He has developed pain on the ball of the rig discussed with him appropriate shoe gear and accommodation and the use of a pumice stone and lotion. I plan to reevaluate this patient as needed.   It may be that he would benefit from a hinged shoe accommodation and final option would be surgery but that

## 2020-12-10 ENCOUNTER — APPOINTMENT (OUTPATIENT)
Dept: PHYSICAL THERAPY | Facility: HOSPITAL | Age: 70
End: 2020-12-10
Attending: PHYSICAL MEDICINE & REHABILITATION
Payer: MEDICARE

## 2020-12-14 ENCOUNTER — OFFICE VISIT (OUTPATIENT)
Dept: PHYSICAL THERAPY | Facility: HOSPITAL | Age: 70
End: 2020-12-14
Attending: PHYSICAL MEDICINE & REHABILITATION
Payer: MEDICARE

## 2020-12-14 PROCEDURE — 97140 MANUAL THERAPY 1/> REGIONS: CPT

## 2020-12-14 PROCEDURE — 97110 THERAPEUTIC EXERCISES: CPT

## 2020-12-14 NOTE — PROGRESS NOTES
Dx: Primary osteoarthritis of left knee (M17.12)  Patellar tendonitis of left knee (M76.52)  Iliotibial band syndrome of left side (M76.32)        Insurance (Authorized # of Visits): 10 MEDICARE PART A&B           Authorizing Physician: Dr. Tamie Kaur MD Treatment: MT 15 min, EX 38  min  Total Treatment Time: 55 min

## 2020-12-17 ENCOUNTER — OFFICE VISIT (OUTPATIENT)
Dept: PHYSICAL THERAPY | Facility: HOSPITAL | Age: 70
End: 2020-12-17
Attending: PHYSICAL MEDICINE & REHABILITATION
Payer: MEDICARE

## 2020-12-17 PROCEDURE — 97110 THERAPEUTIC EXERCISES: CPT

## 2020-12-17 PROCEDURE — 97140 MANUAL THERAPY 1/> REGIONS: CPT

## 2020-12-17 NOTE — PROGRESS NOTES
Dx: Primary osteoarthritis of left knee (M17.12)  Patellar tendonitis of left knee (M76.52)  Iliotibial band syndrome of left side (M76.32)        Insurance (Authorized # of Visits): 10 MEDICARE PART A&B           Authorizing Physician: Dr. Diane Kaur MD Gr 3 L  -Tibial posterior glide Gr 3 L      EX  -step over and back for Proximal hip loading 2x15 bilat  -Wall sit SL review for isometrics 3x45 sec  -Standing TKE FR 2 sets to fatigue bilat  -Runner's pose isometrics 5x10 sec bilat  -Ankle rocking x30 latrice

## 2020-12-22 ENCOUNTER — APPOINTMENT (OUTPATIENT)
Dept: PHYSICAL THERAPY | Facility: HOSPITAL | Age: 70
End: 2020-12-22
Attending: PHYSICAL MEDICINE & REHABILITATION
Payer: MEDICARE

## 2020-12-29 ENCOUNTER — APPOINTMENT (OUTPATIENT)
Dept: PHYSICAL THERAPY | Facility: HOSPITAL | Age: 70
End: 2020-12-29
Attending: PHYSICAL MEDICINE & REHABILITATION
Payer: MEDICARE

## 2020-12-29 PROCEDURE — 97110 THERAPEUTIC EXERCISES: CPT

## 2020-12-29 PROCEDURE — 97140 MANUAL THERAPY 1/> REGIONS: CPT

## 2020-12-29 NOTE — PROGRESS NOTES
Dx: Primary osteoarthritis of left knee (M17.12)  Patellar tendonitis of left knee (M76.52)  Iliotibial band syndrome of left side (M76.32)        Insurance (Authorized # of Visits): 10 MEDICARE PART A&B           Authorizing Physician: Dr. Gracie Kaur MD anterior glide Gr 3 L  -Tibial posterior glide Gr 3 L MT  -L TCJ, STJ, 2nd TMT, calcaneocuboid, talonavicular, manipulations.   -patellar mobs gr 3 L  -tibial anterior glide Gr 3 L  -Tibial posterior glide Gr 3 L     EX  -step over and back for Proximal hip

## 2021-01-01 ENCOUNTER — EXTERNAL RECORD (OUTPATIENT)
Dept: HEALTH INFORMATION MANAGEMENT | Age: 71
End: 2021-01-01

## 2021-01-04 ENCOUNTER — OFFICE VISIT (OUTPATIENT)
Dept: PHYSICAL THERAPY | Facility: HOSPITAL | Age: 71
End: 2021-01-04
Attending: PHYSICAL MEDICINE & REHABILITATION
Payer: MEDICARE

## 2021-01-04 PROCEDURE — 97140 MANUAL THERAPY 1/> REGIONS: CPT

## 2021-01-04 PROCEDURE — 97110 THERAPEUTIC EXERCISES: CPT

## 2021-01-04 NOTE — PROGRESS NOTES
Dx: Primary osteoarthritis of left knee (M17.12)  Patellar tendonitis of left knee (M76.52)  Iliotibial band syndrome of left side (M76.32)        Insurance (Authorized # of Visits): 10 MEDICARE PART A&B           Authorizing Physician: Dr. Regine Kaur MD anterior glide Gr 3 L  -Tibial posterior glide Gr 3 L MT  -L TCJ, STJ, 2nd TMT, calcaneocuboid, talonavicular, manipulations.   -patellar mobs gr 3 L  -tibial anterior glide Gr 3 L  -Tibial posterior glide Gr 3 L     EX  -step over and back for Proximal hip

## 2021-01-04 NOTE — PROGRESS NOTES
Dx: Primary osteoarthritis of left knee (M17.12)  Patellar tendonitis of left knee (M76.52)  Iliotibial band syndrome of left side (M76.32)        Insurance (Authorized # of Visits): 10 MEDICARE PART A&B           Authorizing Physician: Dr. Diane Kaur MD Ying Mg, PT    [de-identified] certification required: Yes  I certify the need for these services furnished under this plan of treatment and while under my care.     X___________________________________________________ Date____________________    Certific for above HEP - added exercises above to substitute for standing TKE FR. HEP -step down hip hike holds added.  HEP - reviewed HEP above             Charges: MT, EX 2     Total Timed Treatment: MT 15 min, EX 28  min  Total Treatment Time: 45 min

## 2021-01-05 ENCOUNTER — APPOINTMENT (OUTPATIENT)
Dept: PHYSICAL THERAPY | Facility: HOSPITAL | Age: 71
End: 2021-01-05
Attending: PHYSICAL MEDICINE & REHABILITATION
Payer: MEDICARE

## 2021-01-07 ENCOUNTER — APPOINTMENT (OUTPATIENT)
Dept: PHYSICAL THERAPY | Facility: HOSPITAL | Age: 71
End: 2021-01-07
Attending: PHYSICAL MEDICINE & REHABILITATION
Payer: MEDICARE

## 2021-01-07 NOTE — ADDENDUM NOTE
Addended by: Royal Mosley on: 10/24/2019 10:28 AM     Modules accepted: Nomi Both parents verbalized discharge instructions.

## 2021-02-10 ENCOUNTER — TELEPHONE (OUTPATIENT)
Dept: NEUROLOGY | Facility: CLINIC | Age: 71
End: 2021-02-10

## 2021-02-11 DIAGNOSIS — Z23 NEED FOR VACCINATION: ICD-10-CM

## 2021-02-15 ENCOUNTER — TELEMEDICINE (OUTPATIENT)
Dept: NEUROLOGY | Facility: CLINIC | Age: 71
End: 2021-02-15
Payer: MEDICARE

## 2021-02-15 DIAGNOSIS — M54.2 NECK PAIN ON LEFT SIDE: ICD-10-CM

## 2021-02-15 DIAGNOSIS — M43.22 CERVICAL VERTEBRAL FUSION: ICD-10-CM

## 2021-02-15 DIAGNOSIS — M25.562 ACUTE PAIN OF LEFT KNEE: ICD-10-CM

## 2021-02-15 DIAGNOSIS — M76.52 PATELLAR TENDONITIS OF LEFT KNEE: ICD-10-CM

## 2021-02-15 DIAGNOSIS — G89.29 CHRONIC RIGHT-SIDED LOW BACK PAIN WITH RIGHT-SIDED SCIATICA: ICD-10-CM

## 2021-02-15 DIAGNOSIS — M76.32 ILIOTIBIAL BAND SYNDROME OF LEFT SIDE: ICD-10-CM

## 2021-02-15 DIAGNOSIS — M54.41 CHRONIC RIGHT-SIDED LOW BACK PAIN WITH RIGHT-SIDED SCIATICA: ICD-10-CM

## 2021-02-15 DIAGNOSIS — M50.90 CERVICAL DISC DISEASE: ICD-10-CM

## 2021-02-15 DIAGNOSIS — M43.16 SPONDYLOLISTHESIS OF LUMBAR REGION: ICD-10-CM

## 2021-02-15 DIAGNOSIS — M51.26 LUMBAR HERNIATED DISC: ICD-10-CM

## 2021-02-15 DIAGNOSIS — M54.16 LUMBAR RADICULOPATHY: Primary | ICD-10-CM

## 2021-02-15 DIAGNOSIS — M43.10 RETROLISTHESIS: ICD-10-CM

## 2021-02-15 DIAGNOSIS — M17.12 PRIMARY OSTEOARTHRITIS OF LEFT KNEE: ICD-10-CM

## 2021-02-15 DIAGNOSIS — M54.12 CERVICAL RADICULOPATHY: ICD-10-CM

## 2021-02-15 DIAGNOSIS — M51.9 LUMBAR DISC DISEASE: ICD-10-CM

## 2021-02-15 DIAGNOSIS — M48.061 LUMBAR FORAMINAL STENOSIS: ICD-10-CM

## 2021-02-15 DIAGNOSIS — M48.061 LUMBAR STENOSIS WITHOUT NEUROGENIC CLAUDICATION: ICD-10-CM

## 2021-02-15 PROCEDURE — 99214 OFFICE O/P EST MOD 30 MIN: CPT | Performed by: PHYSICAL MEDICINE & REHABILITATION

## 2021-02-15 NOTE — PROGRESS NOTES
[address] Cherry 89  [facility]    [facility]medicine Visit - Evaluation    Mitzy Burgos verbally consents to a Telemedicine Visit on 02/15/21. This visit is conducted using Telemedicine with live, interactive audio and video.     [facility] ENDOSCOPY   • TOTAL HIP REPLACEMENT           CURRENT MEDICATIONS:     Current Outpatient Medications   Medication Sig Dispense Refill   • ezetimibe 10 MG Oral Tab Take 10 mg by mouth daily.      • Bioflavonoid Products (BIOFLEX) Oral Tab Take by mouth pain in the left neck       LABS:   No results found for: EAG, A1C  Lab Results   Component Value Date    WBC 6.2 12/04/2020    RBC 4.94 12/04/2020    HGB 14.9 12/04/2020    HCT 44.4 12/04/2020    MCV 89.9 12/04/2020    MCH 30.2 12/04/2020    MCHC 33.6 12/ resolved, then I will give him a prescription to see a PT or a chiropractor. If he is better, he continue to hold on swimming until his swimming stroke can be better evaluated. The patient will continue with his home exercise program for the knee.   He telehealth consent form and other related consent forms and documents. which are located on the [facility] website. The patient verbally agreed to telehealth consent form, related consents and the risks discussed.     Lastly, the patient confirmed that they were

## 2021-02-24 ENCOUNTER — TELEPHONE (OUTPATIENT)
Dept: NEUROLOGY | Facility: CLINIC | Age: 71
End: 2021-02-24

## 2021-02-24 DIAGNOSIS — M54.12 CERVICAL RADICULOPATHY: ICD-10-CM

## 2021-02-24 DIAGNOSIS — M43.22 CERVICAL VERTEBRAL FUSION: ICD-10-CM

## 2021-02-24 DIAGNOSIS — M50.90 CERVICAL DISC DISEASE: Primary | ICD-10-CM

## 2021-02-24 NOTE — TELEPHONE ENCOUNTER
S/W patient and he stated he completed PT in Jan. Patient stated he is doing home exercise for knee. Patient states he is having neck pain and would like to have a general script or referral for chiropractor.  Patient stated he does know of a few chiropract

## 2021-02-26 NOTE — TELEPHONE ENCOUNTER
He would do best to see Balbir again. With his congential fusion of the cervical spine, I think that PT would be better for him as opposed to chiropractic care.

## 2021-02-26 NOTE — TELEPHONE ENCOUNTER
S/W patient and he stated that the PT did not help much from before. Patient states he was only given 1-2 exercises and follow through with home exercises and still would like to see chiropractor. Patient states the pain is now in the base of his skull. P

## 2021-03-01 NOTE — TELEPHONE ENCOUNTER
Usually, no prescription is needed for chiropractic services, but I would be willing to speak with his chiropractor if needed.

## 2021-03-01 NOTE — TELEPHONE ENCOUNTER
S/W patient and he stated just started swimming again. Patient stated he is starting to feel better. He stated he might not need to see a chiropractor.  Patient stated he has 3 different chiropractors and stated he will call our office with whom he decided

## 2021-04-02 ENCOUNTER — TELEPHONE (OUTPATIENT)
Dept: SURGERY | Facility: CLINIC | Age: 71
End: 2021-04-02

## 2021-04-02 DIAGNOSIS — C61 PROSTATE CANCER (HCC): Primary | ICD-10-CM

## 2021-04-02 NOTE — TELEPHONE ENCOUNTER
Per pt has appt with Dr. Melia Larry 4/19 and needs orders for labs and urine test entered.  Pt also asking if he needs to fast for PSA test. Please advise

## 2021-04-05 ENCOUNTER — LAB ENCOUNTER (OUTPATIENT)
Dept: LAB | Facility: HOSPITAL | Age: 71
End: 2021-04-05
Attending: UROLOGY
Payer: MEDICARE

## 2021-04-05 DIAGNOSIS — C61 PROSTATE CANCER (HCC): ICD-10-CM

## 2021-04-05 PROCEDURE — 36415 COLL VENOUS BLD VENIPUNCTURE: CPT

## 2021-04-05 PROCEDURE — 81003 URINALYSIS AUTO W/O SCOPE: CPT

## 2021-04-05 PROCEDURE — 84153 ASSAY OF PSA TOTAL: CPT

## 2021-04-08 ENCOUNTER — HOSPITAL ENCOUNTER (OUTPATIENT)
Dept: PAIN MANAGEMENT | Age: 71
Discharge: HOME | End: 2021-04-08
Attending: ANESTHESIOLOGY

## 2021-04-08 ENCOUNTER — HOSPITAL ENCOUNTER (OUTPATIENT)
Dept: GENERAL RADIOLOGY | Age: 71
Discharge: HOME | End: 2021-04-08
Attending: ANESTHESIOLOGY

## 2021-04-08 VITALS
TEMPERATURE: 98.1 F | WEIGHT: 165 LBS | RESPIRATION RATE: 14 BRPM | OXYGEN SATURATION: 98 % | HEART RATE: 54 BPM | SYSTOLIC BLOOD PRESSURE: 156 MMHG | DIASTOLIC BLOOD PRESSURE: 74 MMHG | HEIGHT: 69 IN | BODY MASS INDEX: 24.44 KG/M2

## 2021-04-08 DIAGNOSIS — R52 PAIN: ICD-10-CM

## 2021-04-08 DIAGNOSIS — R52 PAIN: Primary | ICD-10-CM

## 2021-04-08 PROCEDURE — 99213 OFFICE O/P EST LOW 20 MIN: CPT

## 2021-04-08 PROCEDURE — 73562 X-RAY EXAM OF KNEE 3: CPT

## 2021-04-08 ASSESSMENT — ACTIVITIES OF DAILY LIVING (ADL)
RECENT_DECLINE_ADL: NO
ADL_SHORT_OF_BREATH: NO
ADL_SCORE: 12
ADL_BEFORE_ADMISSION: INDEPENDENT

## 2021-04-08 ASSESSMENT — PAIN SCALES - GENERAL: PAINLEVEL_OUTOF10: 2

## 2021-04-12 ENCOUNTER — LAB SERVICES (OUTPATIENT)
Dept: LAB | Age: 71
End: 2021-04-12

## 2021-04-12 DIAGNOSIS — Z01.818 PRE-OP TESTING: ICD-10-CM

## 2021-04-12 DIAGNOSIS — Z01.818 PRE-OP TESTING: Primary | ICD-10-CM

## 2021-04-12 PROCEDURE — U0003 INFECTIOUS AGENT DETECTION BY NUCLEIC ACID (DNA OR RNA); SEVERE ACUTE RESPIRATORY SYNDROME CORONAVIRUS 2 (SARS-COV-2) (CORONAVIRUS DISEASE [COVID-19]), AMPLIFIED PROBE TECHNIQUE, MAKING USE OF HIGH THROUGHPUT TECHNOLOGIES AS DESCRIBED BY CMS-2020-01-R: HCPCS | Performed by: CLINICAL MEDICAL LABORATORY

## 2021-04-12 PROCEDURE — U0005 INFEC AGEN DETEC AMPLI PROBE: HCPCS | Performed by: CLINICAL MEDICAL LABORATORY

## 2021-04-13 LAB
SARS-COV-2 RNA RESP QL NAA+PROBE: NOT DETECTED
SERVICE CMNT-IMP: NORMAL
SERVICE CMNT-IMP: NORMAL

## 2021-04-14 ENCOUNTER — HOSPITAL ENCOUNTER (OUTPATIENT)
Dept: PAIN MANAGEMENT | Age: 71
Discharge: HOME | End: 2021-04-14
Attending: ANESTHESIOLOGY

## 2021-04-14 VITALS
WEIGHT: 165 LBS | DIASTOLIC BLOOD PRESSURE: 63 MMHG | HEIGHT: 69 IN | TEMPERATURE: 97.9 F | OXYGEN SATURATION: 97 % | BODY MASS INDEX: 24.44 KG/M2 | RESPIRATION RATE: 16 BRPM | SYSTOLIC BLOOD PRESSURE: 127 MMHG | HEART RATE: 50 BPM

## 2021-04-14 PROCEDURE — 76942 ECHO GUIDE FOR BIOPSY: CPT

## 2021-04-14 PROCEDURE — 64450 NJX AA&/STRD OTHER PN/BRANCH: CPT

## 2021-04-14 PROCEDURE — 13000001 HB PHASE II RECOVERY EA 30 MINUTES

## 2021-04-14 PROCEDURE — 64447 NJX AA&/STRD FEMORAL NRV IMG: CPT

## 2021-04-14 PROCEDURE — 10002801 HB RX 250 W/O HCPCS: Performed by: ANESTHESIOLOGY

## 2021-04-14 RX ORDER — BUPIVACAINE HYDROCHLORIDE 2.5 MG/ML
10 INJECTION, SOLUTION EPIDURAL; INFILTRATION; INTRACAUDAL ONCE
Status: COMPLETED | OUTPATIENT
Start: 2021-04-14 | End: 2021-04-14

## 2021-04-14 RX ORDER — METHYLPREDNISOLONE ACETATE 40 MG/ML
80 INJECTION, SUSPENSION INTRA-ARTICULAR; INTRALESIONAL; INTRAMUSCULAR; SOFT TISSUE ONCE
Status: COMPLETED | OUTPATIENT
Start: 2021-04-14 | End: 2021-04-14

## 2021-04-14 RX ADMIN — METHYLPREDNISOLONE ACETATE 80 MG: 40 INJECTION, SUSPENSION INTRA-ARTICULAR; INTRALESIONAL; INTRAMUSCULAR; SOFT TISSUE at 08:18

## 2021-04-14 RX ADMIN — BUPIVACAINE HYDROCHLORIDE 25 MG: 2.5 INJECTION, SOLUTION EPIDURAL; INFILTRATION; INTRACAUDAL; PERINEURAL at 08:19

## 2021-04-14 ASSESSMENT — PAIN SCALES - GENERAL
PAINLEVEL_OUTOF10: 3
PAINLEVEL_OUTOF10: 5

## 2021-04-19 ENCOUNTER — OFFICE VISIT (OUTPATIENT)
Dept: SURGERY | Facility: CLINIC | Age: 71
End: 2021-04-19
Payer: MEDICARE

## 2021-04-19 VITALS — HEIGHT: 69 IN | WEIGHT: 169 LBS | BODY MASS INDEX: 25.03 KG/M2

## 2021-04-19 DIAGNOSIS — N52.9 ERECTILE DYSFUNCTION, UNSPECIFIED ERECTILE DYSFUNCTION TYPE: ICD-10-CM

## 2021-04-19 DIAGNOSIS — C61 PROSTATE CANCER (HCC): Primary | ICD-10-CM

## 2021-04-19 DIAGNOSIS — N28.1 RENAL CYST: ICD-10-CM

## 2021-04-19 DIAGNOSIS — R35.1 NOCTURIA: ICD-10-CM

## 2021-04-19 PROCEDURE — 99213 OFFICE O/P EST LOW 20 MIN: CPT | Performed by: UROLOGY

## 2021-04-19 NOTE — PATIENT INSTRUCTIONS
Reymundo Whitfield M.D.    1.  Continue to get blood draw for PSA at least once yearly in light of prostate cancer in the past    2.   Please return to see us in 2 years or sooner especially if PSA were to rise above le

## 2021-04-19 NOTE — PROGRESS NOTES
HPI:    Patient ID: Kostas Blanc is a 73year old male. HPI     Prostate Cancer  Chronic. S/p 03/25/2013 Brachytherapy palladium. Patient is not currently undergoing any hormone ablation therapy or chemotherapy. Most recent 04/05/2021 PSA = <0.01.  He 1/28/13 at [facility] cystoscopy, bilateral retrograde pyelogram, removal of bladder lesion and fulguration; partial BPH obstruction; 5 mm erythematous lesion removed and it was benign and retrograde pyelograms normal. 3/25/13 palladium brachyth COLONOSCOPY  10/12, 11/17   • COLONOSCOPY N/A 11/8/2017    Procedure: COLONOSCOPY;  Surgeon: Nadia Abdi MD;  Location: [address] [place] [address] ENDOSCOPY   • COLONOSCOPY  01/2020   • EGD N/A 2/3/2017    Procedure: ESOPHAGOGASTRODUODENOSCOPY (EGD);   Surgeon: Rianna Simmons 07/06/2020 Creatinine = 1.14; GFR = 66   01/03/2020 Creatinine = 1.01; GFR = 77   12/03/2019 PSA = 0.01; Creatinine = 1.34; GFR = 54; UA blood = small; protein = negative; Hyaline casts = 25 elevated (0-5);  WBC = 1; RBC = 1; Bacteria = few   12/07/2018 P precancerous and no further treatment is needed; he understands and agrees.     (R35.1) Nocturia  Chronic. Patient has current IPSS score 10.5, moderate voiding dysfunction category; nocturia 2-3x. He is not currently taking any medication for this.  The pa

## 2021-04-21 ENCOUNTER — HOSPITAL ENCOUNTER (OUTPATIENT)
Dept: PAIN MANAGEMENT | Age: 71
Discharge: HOME | End: 2021-04-21
Attending: ANESTHESIOLOGY

## 2021-04-28 ENCOUNTER — HOSPITAL ENCOUNTER (OUTPATIENT)
Dept: PAIN MANAGEMENT | Age: 71
Discharge: HOME | End: 2021-04-28
Attending: ANESTHESIOLOGY

## 2021-04-28 ENCOUNTER — APPOINTMENT (OUTPATIENT)
Dept: PAIN MANAGEMENT | Age: 71
End: 2021-04-28
Attending: ANESTHESIOLOGY

## 2021-04-28 VITALS — HEART RATE: 45 BPM | TEMPERATURE: 98.2 F | OXYGEN SATURATION: 97 % | RESPIRATION RATE: 14 BRPM

## 2021-04-28 DIAGNOSIS — R52 PAIN: Primary | ICD-10-CM

## 2021-04-28 PROCEDURE — 99213 OFFICE O/P EST LOW 20 MIN: CPT

## 2021-04-29 ENCOUNTER — APPOINTMENT (OUTPATIENT)
Dept: PAIN MANAGEMENT | Age: 71
End: 2021-04-29
Attending: ANESTHESIOLOGY

## 2021-04-29 RX ORDER — BUPIVACAINE HYDROCHLORIDE 2.5 MG/ML
10 INJECTION, SOLUTION EPIDURAL; INFILTRATION; INTRACAUDAL ONCE
Status: CANCELLED | OUTPATIENT
Start: 2021-04-29

## 2021-04-29 RX ORDER — METHYLPREDNISOLONE ACETATE 80 MG/ML
80 INJECTION, SUSPENSION INTRA-ARTICULAR; INTRALESIONAL; INTRAMUSCULAR; SOFT TISSUE ONCE
Status: CANCELLED | OUTPATIENT
Start: 2021-04-29

## 2021-05-07 ENCOUNTER — HOSPITAL ENCOUNTER (OUTPATIENT)
Dept: GENERAL RADIOLOGY | Facility: HOSPITAL | Age: 71
Discharge: HOME | End: 2021-05-07
Attending: INTERNAL MEDICINE
Payer: MEDICARE

## 2021-05-07 DIAGNOSIS — M54.6 PAIN IN THORACIC SPINE: ICD-10-CM

## 2021-05-07 DIAGNOSIS — R05.9 COUGH: ICD-10-CM

## 2021-05-07 PROCEDURE — 71046 X-RAY EXAM CHEST 2 VIEWS: CPT | Performed by: INTERNAL MEDICINE

## 2021-05-07 PROCEDURE — 72072 X-RAY EXAM THORAC SPINE 3VWS: CPT | Performed by: INTERNAL MEDICINE

## 2021-05-28 ENCOUNTER — HOSPITAL ENCOUNTER (OUTPATIENT)
Dept: CT IMAGING | Age: 71
Discharge: HOME | End: 2021-05-28
Attending: INTERNAL MEDICINE
Payer: MEDICARE

## 2021-05-28 DIAGNOSIS — R07.89 OTHER CHEST PAIN: ICD-10-CM

## 2021-05-28 PROCEDURE — 71260 CT THORAX DX C+: CPT | Performed by: INTERNAL MEDICINE

## 2021-05-28 PROCEDURE — 82565 ASSAY OF CREATININE: CPT

## 2021-06-29 RX ORDER — EZETIMIBE 10 MG/1
TABLET ORAL
Qty: 90 TABLET | Refills: 0 | Status: SHIPPED | OUTPATIENT
Start: 2021-06-29

## 2021-08-19 ENCOUNTER — OFFICE VISIT (OUTPATIENT)
Dept: PAIN CLINIC | Facility: HOSPITAL | Age: 71
End: 2021-08-19
Attending: ANESTHESIOLOGY
Payer: MEDICARE

## 2021-08-19 VITALS
HEART RATE: 50 BPM | WEIGHT: 169 LBS | RESPIRATION RATE: 18 BRPM | SYSTOLIC BLOOD PRESSURE: 118 MMHG | BODY MASS INDEX: 25.03 KG/M2 | HEIGHT: 69 IN | DIASTOLIC BLOOD PRESSURE: 67 MMHG

## 2021-08-19 DIAGNOSIS — G57.11 NEUROPATHIC PAIN INVOLVING RIGHT LATERAL FEMORAL CUTANEOUS NERVE: Primary | ICD-10-CM

## 2021-08-19 DIAGNOSIS — Z96.641 HISTORY OF TOTAL RIGHT HIP REPLACEMENT: ICD-10-CM

## 2021-08-19 PROCEDURE — 99202 OFFICE O/P NEW SF 15 MIN: CPT

## 2021-08-19 RX ORDER — GABAPENTIN 100 MG/1
50 CAPSULE ORAL 3 TIMES DAILY
COMMUNITY
Start: 2021-07-26

## 2021-08-19 NOTE — PROGRESS NOTES
8/19/2021-presents ambulatory to [facility] to establish care; NEW CONSULT C/O RT KNEE PAIN RADIATING UP THE LATERAL RT THIGH; at this time 2/10 pain;  Pt states at times can be 8/10;   Hx of rt hip replcmt 5yrs ago; he started having hip pain soon after the surge

## 2021-08-19 NOTE — CHRONIC PAIN
[facility]  Report of Consultation    Javier Ceja Patient Status:  Outpatient    1950 MRN [id number]   Location [address] [facility] Attending Live Yates MD     PCP Jonathan Vazquez MD       Date of Co either of his lower extremities. He denies any low back or radicular pain from his lower back or buttocks region. He denies any change in bowel or bladder habits. His only area of numbness is slightly on the right lateral thigh.   He reports no issues wi answers questions appropriately appears to be comfortable in bed NAD  Head: normocephalic, atraumatic  Eyes: anicteric; no injection  Ears: no obvious deformities noted   Nose: externally grossly within normal limits, no unusual discharge or rhinorrhea   T compromise. L2-L3: Small diffuse disc bulge is noted, eccentric to the right. Mild facet arthropathy is apparent. Mild right lateral recess narrowing results without significant spinal canal or neural foraminal compromise.    L3-L4: There is a moderate di intestines, and rectum     Dysphagia     Stricture and stenosis of esophagus     Personal history of irradiation, presenting hazards to health     History of adenomatous polyp of colon     Hyperlipidemia     Myopia     Microscopic hematuria     Neuralgia a improvement of his burning sensation to the lateral aspect of his thigh. He recently has been given the option of spinal cord stimulation.   But in my opinion in light of the fact that he does have some pathology on his MRI of a grade 1 anterolisthesis at

## 2021-08-30 ENCOUNTER — TELEPHONE (OUTPATIENT)
Dept: CASE MANAGEMENT | Age: 71
End: 2021-08-30

## 2021-08-30 ENCOUNTER — NURSE ONLY (OUTPATIENT)
Dept: INFUSION CENTER | Age: 71
End: 2021-08-30
Attending: INTERNAL MEDICINE
Payer: MEDICARE

## 2021-08-30 VITALS
SYSTOLIC BLOOD PRESSURE: 112 MMHG | TEMPERATURE: 98 F | OXYGEN SATURATION: 99 % | RESPIRATION RATE: 16 BRPM | DIASTOLIC BLOOD PRESSURE: 69 MMHG | BODY MASS INDEX: 24.44 KG/M2 | HEIGHT: 69 IN | HEART RATE: 54 BPM | WEIGHT: 165 LBS

## 2021-08-30 NOTE — TELEPHONE ENCOUNTER
Referral for antibody infusion received from Dr Vasiliy Adrian. Ab infusion scheduled for patient at [address] [facility] for today at 10:15. Advised patient to arrive 15 minutes prior to appt and to call Registration Hotline at [phone] once they arrive.   Advise

## 2021-08-30 NOTE — PROGRESS NOTES
1045- infusion started ,iv wnl, pt aware of plan of care . 1105- infusion complete , iv flushed per protocol, pt stable no distress noted , pt denies any new complaints. 1130- pt stable, no distress noted, pt denies any new complaints.      1150- pt

## 2021-10-21 ENCOUNTER — TELEPHONE (OUTPATIENT)
Dept: SURGERY | Facility: CLINIC | Age: 71
End: 2021-10-21

## 2021-10-21 NOTE — TELEPHONE ENCOUNTER
Pt is out of town. Pt went to emergency room on 10-17-21 for a kidney stone. Pt is taking pain medication. Drinking a lot of water. Pt has not passed the stone and having pain. Pt is scheduled to fly back on [place] 10-24-21.   Please call

## 2021-10-21 NOTE — TELEPHONE ENCOUNTER
Spoke with pt via phone. Verified name and . Pt is having severe pain due his kidney stone that was diagnosed. It's in the R ureter and about 1 mm in size. He is straining his urine and drinking large amounts of water. Pt states he is on Tamsulosin.  He

## 2021-10-25 ENCOUNTER — TELEPHONE (OUTPATIENT)
Dept: SURGERY | Facility: CLINIC | Age: 71
End: 2021-10-25

## 2021-10-25 ENCOUNTER — LAB ENCOUNTER (OUTPATIENT)
Dept: LAB | Facility: HOSPITAL | Age: 71
End: 2021-10-25
Attending: UROLOGY
Payer: MEDICARE

## 2021-10-25 ENCOUNTER — OFFICE VISIT (OUTPATIENT)
Dept: SURGERY | Facility: CLINIC | Age: 71
End: 2021-10-25
Payer: MEDICARE

## 2021-10-25 VITALS
HEART RATE: 46 BPM | BODY MASS INDEX: 23.7 KG/M2 | WEIGHT: 160 LBS | HEIGHT: 69 IN | RESPIRATION RATE: 16 BRPM | SYSTOLIC BLOOD PRESSURE: 129 MMHG | DIASTOLIC BLOOD PRESSURE: 77 MMHG

## 2021-10-25 DIAGNOSIS — R35.1 NOCTURIA: ICD-10-CM

## 2021-10-25 DIAGNOSIS — Z82.69 FAMILY HISTORY OF GOUT: ICD-10-CM

## 2021-10-25 DIAGNOSIS — N13.2 HYDRONEPHROSIS WITH URINARY OBSTRUCTION DUE TO URETERAL CALCULUS: ICD-10-CM

## 2021-10-25 DIAGNOSIS — N20.0 KIDNEY STONE: Primary | ICD-10-CM

## 2021-10-25 DIAGNOSIS — C61 PROSTATE CANCER (HCC): ICD-10-CM

## 2021-10-25 DIAGNOSIS — N28.1 RENAL CYST: ICD-10-CM

## 2021-10-25 DIAGNOSIS — N52.9 ERECTILE DYSFUNCTION, UNSPECIFIED ERECTILE DYSFUNCTION TYPE: ICD-10-CM

## 2021-10-25 DIAGNOSIS — R35.0 URINARY FREQUENCY: ICD-10-CM

## 2021-10-25 DIAGNOSIS — Z79.82 LONG-TERM USE OF ASPIRIN THERAPY: ICD-10-CM

## 2021-10-25 DIAGNOSIS — N20.0 KIDNEY STONE: ICD-10-CM

## 2021-10-25 PROCEDURE — 99215 OFFICE O/P EST HI 40 MIN: CPT | Performed by: UROLOGY

## 2021-10-25 PROCEDURE — 36415 COLL VENOUS BLD VENIPUNCTURE: CPT

## 2021-10-25 PROCEDURE — 87086 URINE CULTURE/COLONY COUNT: CPT

## 2021-10-25 PROCEDURE — 84550 ASSAY OF BLOOD/URIC ACID: CPT

## 2021-10-25 RX ORDER — TAMSULOSIN HYDROCHLORIDE 0.4 MG/1
0.4 CAPSULE ORAL DAILY
COMMUNITY

## 2021-10-25 NOTE — PATIENT INSTRUCTIONS
Roz Kramer M.D.    1.  In light of the fact that you have no appetite and that you have very strong urges to urinate, urinary frequency--all of this is suspicious that your stone is likely present in the lower part

## 2021-10-25 NOTE — TELEPHONE ENCOUNTER
Per pt is experiencing kidney stone symptoms, asking for sooner appt than first available .  Please advise

## 2021-10-25 NOTE — TELEPHONE ENCOUNTER
Urology nurses and Prince Regalado ,    I responded to this acute call from this current patient of mine; I spoke to him; he feels sick; he wants to be seen today. I asked him to come to the office at 3:40 PM today. Patient is aware.   He needs to get a pain questio

## 2021-10-25 NOTE — TELEPHONE ENCOUNTER
I s/w pt and determined that he was OOT for a week and was in the ER twice during that time and was told he had a 3 mm stone.  He had severe pain in the Rt flank which has resolved now and now he has pain in the Lt lower back which he is not sure is related

## 2021-10-25 NOTE — PROGRESS NOTES
HPI:    Patient ID: Kostas Blanc is a 73year old male. HPI    Right Abdominal Pain/ Kidney stone  Problem started 10/17/2021. Right flank - right groin pain.  Patient was went to [facility] for pain; CT performed which showed postponing urination, weak stream, and nocturia 5x. The patient denies  sensation of not emptying bladder, urinary hesitancy , as well as  associated gross hematuria  or dysuria.   Patient is currently taking tamsulosin 0.4 mg daily, start 10/17/2021 at [facility] observation                 Review of Systems   Constitutional: Negative for fever. HENT: Negative for voice change. Respiratory: Negative for chest tightness and shortness of breath. Cardiovascular: Negative for chest pain.    Gastrointestinal: Neg EGD  01/2020    w dilation to 46 Fr   • EGD SCOPE  2/15, 2/17    stricture dilated   • TOTAL HIP REPLACEMENT        Family History   Problem Relation Age of Onset   • Kidney Disease Father         Per NG: Renal disease   • Heart Disease Father 80        Pe distress.    Abdominal:      Comments: Flanks non-tender to palpation  Bladder slightly tender to palpation   Genitourinary:     Comments: I did not examine prostate today; however, on 04/19/2021 KIMI, prostate nonpalpable, no palpable nodules or indurations nodes.    12/24/2019  KIDNEY/BLADDER = No hydronephrosis;  Simple right renal cysts; Grossly normal urinary bladder          I spent  43  minutes with patient and on this case today, in reviewing chart and labs before entering the room, taking patient's risks, and alternatives to this treatment option and I answered patient's questions; patient decides against surgery as he is asymptomatic at the moment and chooses to undergo KUB plain x-ray and attempt to pass the stone on his own with increased fluid co precancerous and no further treatment is needed; he understands and agrees.      (R35.1) Nocturia  Chronic. Patient has current IPSS score 19, severe voiding dysfunction category; nocturia 5x.  He is currently taking tamsulosin 0.4 mg daily and is pushing l Please schedule this test by calling [phone]. 1.  Please undergo KUB plain x-ray; this does not require an appointment.   Please take milk of magnesia or generic equivalent 2 ounces = 60 mL =   4 tablespoons 6 PM, evening before the x-ray to induce applicable) and agree that the record reflects my personal performance and is accurate and complete.   Ronny Payne MD, 10/25/2021, 6:22 PM

## 2021-10-26 ENCOUNTER — HOSPITAL ENCOUNTER (OUTPATIENT)
Dept: GENERAL RADIOLOGY | Age: 71
Discharge: HOME | End: 2021-10-26
Attending: UROLOGY
Payer: MEDICARE

## 2021-10-26 ENCOUNTER — HOSPITAL ENCOUNTER (OUTPATIENT)
Dept: ULTRASOUND IMAGING | Age: 71
Discharge: HOME | End: 2021-10-26
Attending: UROLOGY
Payer: MEDICARE

## 2021-10-26 DIAGNOSIS — N20.0 KIDNEY STONE: ICD-10-CM

## 2021-10-26 PROCEDURE — 74021 RADEX ABDOMEN 3+ VIEWS: CPT | Performed by: UROLOGY

## 2021-10-26 PROCEDURE — 76770 US EXAM ABDO BACK WALL COMP: CPT | Performed by: UROLOGY

## 2021-11-04 ENCOUNTER — OFFICE VISIT (OUTPATIENT)
Dept: SURGERY | Facility: CLINIC | Age: 71
End: 2021-11-04
Payer: MEDICARE

## 2021-11-04 VITALS
HEART RATE: 48 BPM | HEIGHT: 69 IN | WEIGHT: 160 LBS | BODY MASS INDEX: 23.7 KG/M2 | DIASTOLIC BLOOD PRESSURE: 70 MMHG | SYSTOLIC BLOOD PRESSURE: 131 MMHG

## 2021-11-04 DIAGNOSIS — R35.1 NOCTURIA: ICD-10-CM

## 2021-11-04 DIAGNOSIS — Z82.69 FAMILY HISTORY OF GOUT: ICD-10-CM

## 2021-11-04 DIAGNOSIS — N20.0 KIDNEY STONE: Primary | ICD-10-CM

## 2021-11-04 DIAGNOSIS — R35.0 URINARY FREQUENCY: ICD-10-CM

## 2021-11-04 DIAGNOSIS — Z79.82 LONG-TERM USE OF ASPIRIN THERAPY: ICD-10-CM

## 2021-11-04 DIAGNOSIS — C61 PROSTATE CANCER (HCC): ICD-10-CM

## 2021-11-04 DIAGNOSIS — N28.1 RENAL CYST: ICD-10-CM

## 2021-11-04 DIAGNOSIS — N13.2 HYDRONEPHROSIS WITH URINARY OBSTRUCTION DUE TO URETERAL CALCULUS: ICD-10-CM

## 2021-11-04 DIAGNOSIS — N52.9 ERECTILE DYSFUNCTION, UNSPECIFIED ERECTILE DYSFUNCTION TYPE: ICD-10-CM

## 2021-11-04 PROBLEM — I51.7 CARDIOMEGALY: Status: RESOLVED | Noted: 2018-11-30 | Resolved: 2021-11-04

## 2021-11-04 PROBLEM — R06.00 DYSPNEA: Status: RESOLVED | Noted: 2018-11-28 | Resolved: 2021-11-04

## 2021-11-04 PROCEDURE — 99215 OFFICE O/P EST HI 40 MIN: CPT | Performed by: UROLOGY

## 2021-11-04 NOTE — PATIENT INSTRUCTIONS
Perez Anaya M.D.    1.  Since testing supports the probability that you passed your 3 mm right ureteral stone, you may stop taking tamsulosin. 2.  You may stop straining your urine for kidney stones    3. [place] or [place] or [place], you could probably drop that down to 1000 IU vitamin D3 daily    4. I am asking my staff to give you my handout on food and kidney stone prevention --try to follow that within reason    5.    I recommend that he get a repe

## 2021-11-04 NOTE — PROGRESS NOTES
HPI:    Patient ID: Charanjit Killian is a 73year old male. HPI      Right Abdominal Pain/ Kidney stone  Problem started 10/17/2021. Right flank - right groin pain.  Patient was went to [facility] for pain; CT performed which showe postponing urination, weak stream, and nocturia 3x. The patient denies urinary hesitancy, sensation of not emptying bladder, as well as  associated gross hematuria  or dysuria.   Patient is currently taking tamsulosin 0.4 mg daily, start 10/17/2021 at [facility] on his own; blood draw to screen for possibility of uric acid stone; patient will undergo KUB plain x-ray; Undergo Kidney US for Hydronephrosis                          Review of Systems   Constitutional: Negative for fever.    HENT: Negative for voice huggins Procedure: COLONOSCOPY;  Surgeon: Estiven Sidhu MD;  Location: [facility] ENDOSCOPY   • COLONOSCOPY  01/2020   • EGD N/A 2/3/2017    Procedure: ESOPHAGOGASTRODUODENOSCOPY (EGD);   Surgeon: Estiven Sidhu MD;  Location: [facility] ENDOSCOPY   • EGD  01/2020    ludmila Guerin reviewed. Constitutional:       General: He is not in acute distress. Appearance: He is well-developed. HENT:      Head: Normocephalic and atraumatic. Pulmonary:      Effort: Pulmonary effort is normal. No respiratory distress.    Genitourinary: 3 OR MORE VIEWS  = No stone seen    10/21/2021 [facility]) CT abd/pelvis no IV = obstructing 3 mm calculus at RIGHT UPJ with moderate hydronephrosis; 2.1 cm left adrenal adenoma; stable upper right renal simple cyst; bladder nl; prostate/semi abd/pelvis no IV = obstructing 3 mm calculus at RIGHT UPJ with moderate hydronephrosis. Most recent 10/26/2021 US kidney/bladder= NO hydronephrosis.  Patient feels this is stable and chooses to continue observation.     ([facility]) Prostate cancer ([facility] [address]    Smith Espinoza    (Z82.69) Family history of gout  Patient's father has had gout. I explain to patient that uric acid stones are not well visualized on X-rays. We discussed blood draw for Uric Acid .  I fully explained to patient the benefits, risks, and alternatives to thumb--you may have 2 servings or possibly up to 3 servings of dairy daily, however, try to avoid cheese which is naturally high in salt. Be careful not to take high doses of calcium; you may be better off taking vitamin D3 instead.   With respect to how m

## 2021-11-05 ENCOUNTER — OFFICE VISIT (OUTPATIENT)
Dept: CARDIOLOGY CLINIC | Facility: CLINIC | Age: 71
End: 2021-11-05
Payer: MEDICARE

## 2021-11-05 VITALS — BODY MASS INDEX: 24.34 KG/M2 | WEIGHT: 160.63 LBS | HEIGHT: 68 IN

## 2021-11-05 DIAGNOSIS — E78.00 PURE HYPERCHOLESTEROLEMIA: Primary | ICD-10-CM

## 2021-11-05 DIAGNOSIS — I25.10 CORONARY ARTERY DISEASE INVOLVING NATIVE CORONARY ARTERY OF NATIVE HEART WITHOUT ANGINA PECTORIS: ICD-10-CM

## 2021-11-05 PROCEDURE — 99214 OFFICE O/P EST MOD 30 MIN: CPT | Performed by: INTERNAL MEDICINE

## 2021-11-05 NOTE — PROGRESS NOTES
Subjective:   Patient ID: Avery Herman is a 73year old male. HPI   PROGRESS NOTE    Avery Herman is a 73year old male. Patient presents with: Follow - Up    HPI:   This is a 74 y/o male who is here for his annual cardiac evaluation.   He has a hi [facility]) 2013    Per NG: [place] Prostate-Seeds implant with Dr. Fidel Cosby   • Renal adenoma, right     incidental on CT 5/2021   • Rib lesion right 5th     by CT 5/2021      Social History:  Social History    Tobacco Use      Smoking status: Former Smoker 9: 62 AM.  INDICATIONS: Kidney stone  TECHNIQUE: Ultrasound examination was performed to visualize the kidneys and bladder. FINDINGS:  RIGHT KIDNEY: Right kidney length is 10.62 cm. Normal echotexture.   Simple cyst measuring 4.28 x 4.43 x 4.17 cm and 4.7 SARS-COV-2 BY PCR ([facility])          In conclusion, this 73year old male  with problems listed above has had good control of lipids and life style. He did have progression of CAD by CCTA in 2018. I have suggested a follow-up stress echo.   Additional rec

## 2021-11-21 ENCOUNTER — LAB ENCOUNTER (OUTPATIENT)
Dept: LAB | Facility: HOSPITAL | Age: 71
End: 2021-11-21
Attending: INTERNAL MEDICINE
Payer: MEDICARE

## 2021-11-21 DIAGNOSIS — I25.10 CORONARY ARTERY DISEASE INVOLVING NATIVE CORONARY ARTERY OF NATIVE HEART WITHOUT ANGINA PECTORIS: ICD-10-CM

## 2021-11-24 ENCOUNTER — HOSPITAL ENCOUNTER (OUTPATIENT)
Dept: CV DIAGNOSTICS | Facility: HOSPITAL | Age: 71
Discharge: HOME | End: 2021-11-24
Attending: INTERNAL MEDICINE
Payer: MEDICARE

## 2021-11-24 DIAGNOSIS — I25.10 CORONARY ARTERY DISEASE INVOLVING NATIVE CORONARY ARTERY OF NATIVE HEART WITHOUT ANGINA PECTORIS: ICD-10-CM

## 2021-11-24 PROCEDURE — 93350 STRESS TTE ONLY: CPT | Performed by: INTERNAL MEDICINE

## 2021-11-24 PROCEDURE — 93018 CV STRESS TEST I&R ONLY: CPT | Performed by: INTERNAL MEDICINE

## 2021-11-24 PROCEDURE — 93017 CV STRESS TEST TRACING ONLY: CPT | Performed by: INTERNAL MEDICINE

## 2021-12-07 ENCOUNTER — LAB ENCOUNTER (OUTPATIENT)
Dept: LAB | Facility: HOSPITAL | Age: 71
End: 2021-12-07
Attending: INTERNAL MEDICINE
Payer: MEDICARE

## 2021-12-07 DIAGNOSIS — E78.00 PURE HYPERCHOLESTEROLEMIA: Primary | ICD-10-CM

## 2021-12-07 DIAGNOSIS — N39.0 URINARY TRACT INFECTION, SITE NOT SPECIFIED: ICD-10-CM

## 2021-12-07 DIAGNOSIS — N18.31 CHRONIC KIDNEY DISEASE (CKD) STAGE G3A/A1, MODERATELY DECREASED GLOMERULAR FILTRATION RATE (GFR) BETWEEN 45-59 ML/MIN/1.73 SQUARE METER AND ALBUMINURIA CREATININE RATIO LESS THAN 30 MG/G (HCC): ICD-10-CM

## 2021-12-07 DIAGNOSIS — E55.9 VITAMIN D DEFICIENCY: ICD-10-CM

## 2021-12-07 PROCEDURE — 82570 ASSAY OF URINE CREATININE: CPT

## 2021-12-07 PROCEDURE — 84153 ASSAY OF PSA TOTAL: CPT

## 2021-12-07 PROCEDURE — 84443 ASSAY THYROID STIM HORMONE: CPT

## 2021-12-07 PROCEDURE — 82043 UR ALBUMIN QUANTITATIVE: CPT

## 2021-12-07 PROCEDURE — 81003 URINALYSIS AUTO W/O SCOPE: CPT

## 2021-12-07 PROCEDURE — 82306 VITAMIN D 25 HYDROXY: CPT

## 2021-12-07 PROCEDURE — 80053 COMPREHEN METABOLIC PANEL: CPT

## 2021-12-07 PROCEDURE — 85025 COMPLETE CBC W/AUTO DIFF WBC: CPT

## 2021-12-07 PROCEDURE — 84154 ASSAY OF PSA FREE: CPT

## 2021-12-07 PROCEDURE — 80061 LIPID PANEL: CPT

## 2021-12-07 PROCEDURE — 36415 COLL VENOUS BLD VENIPUNCTURE: CPT

## 2021-12-11 ENCOUNTER — TELEPHONE (OUTPATIENT)
Dept: CARDIOLOGY CLINIC | Facility: CLINIC | Age: 71
End: 2021-12-11

## 2021-12-12 NOTE — TELEPHONE ENCOUNTER
Patient has stress echo. Good exercise level without ischemia by EKG or Echo data. Had some PACs and PVCs but nothing complex (no PAT as last time). No evidence for ischemia at good HR and exercise capacity (good accuracy for test).   Plan is for aggress

## 2022-03-28 ENCOUNTER — TELEPHONE (OUTPATIENT)
Dept: INTERNAL MEDICINE CLINIC | Facility: CLINIC | Age: 72
End: 2022-03-28

## 2022-03-28 NOTE — TELEPHONE ENCOUNTER
Received a call from Dr. Shane Rushing stating patient called him directly stating the pharmacy has been asking for a refills on two medications. Called pharmacy on file [facility] Mail order and they state they have not send out any refill request due to patient has extra refills on file. They stated they mailed out a refill on his Ezetimibe 10 mg on 3/27 and the Rosuvastatin 20 mg on 03/09 and has an extra refill on both medications. They have a prescription that was send for one year supply until September of 2022 from [address] [place] [address]. They stated sometimes the patient might asking for a refill with the old prescription number. Updated phone number/ fax number and address with [facility]. Called and spoke to patient and he is aware the prescriptions are being mailed and that there is an extra refill on file for both medications. If he continues to have issues for the next refill to give us a call directly at the office. Patient verbally understood.

## 2022-04-18 ENCOUNTER — APPOINTMENT (OUTPATIENT)
Dept: GENERAL RADIOLOGY | Facility: HOSPITAL | Age: 72
End: 2022-04-18
Attending: EMERGENCY MEDICINE
Payer: MEDICARE

## 2022-04-18 ENCOUNTER — HOSPITAL ENCOUNTER (EMERGENCY)
Facility: HOSPITAL | Age: 72
Discharge: HOME | End: 2022-04-18
Attending: EMERGENCY MEDICINE
Payer: MEDICARE

## 2022-04-18 VITALS
OXYGEN SATURATION: 98 % | DIASTOLIC BLOOD PRESSURE: 88 MMHG | BODY MASS INDEX: 23.7 KG/M2 | WEIGHT: 160 LBS | HEIGHT: 69 IN | RESPIRATION RATE: 18 BRPM | TEMPERATURE: 98 F | HEART RATE: 75 BPM | SYSTOLIC BLOOD PRESSURE: 140 MMHG

## 2022-04-18 DIAGNOSIS — T18.108A FOREIGN BODY IN ESOPHAGUS, INITIAL ENCOUNTER: Primary | ICD-10-CM

## 2022-04-18 LAB
ANION GAP SERPL CALC-SCNC: 9 MMOL/L (ref 0–18)
BASOPHILS # BLD AUTO: 0.04 X10(3) UL (ref 0–0.2)
BASOPHILS NFR BLD AUTO: 0.5 %
BUN BLD-MCNC: 17 MG/DL (ref 7–18)
BUN/CREAT SERPL: 16.2 (ref 10–20)
CALCIUM BLD-MCNC: 9.5 MG/DL (ref 8.5–10.1)
CHLORIDE SERPL-SCNC: 107 MMOL/L (ref 98–112)
CO2 SERPL-SCNC: 25 MMOL/L (ref 21–32)
CREAT BLD-MCNC: 1.05 MG/DL
DEPRECATED RDW RBC AUTO: 41.1 FL (ref 35.1–46.3)
EOSINOPHIL # BLD AUTO: 0.27 X10(3) UL (ref 0–0.7)
EOSINOPHIL NFR BLD AUTO: 3.6 %
ERYTHROCYTE [DISTWIDTH] IN BLOOD BY AUTOMATED COUNT: 12.6 % (ref 11–15)
GLUCOSE BLD-MCNC: 100 MG/DL (ref 70–99)
HCT VFR BLD AUTO: 41.1 %
HGB BLD-MCNC: 14.3 G/DL
IMM GRANULOCYTES # BLD AUTO: 0.01 X10(3) UL (ref 0–1)
IMM GRANULOCYTES NFR BLD: 0.1 %
LYMPHOCYTES # BLD AUTO: 2.14 X10(3) UL (ref 1–4)
LYMPHOCYTES NFR BLD AUTO: 28.7 %
MCH RBC QN AUTO: 31 PG (ref 26–34)
MCHC RBC AUTO-ENTMCNC: 34.8 G/DL (ref 31–37)
MCV RBC AUTO: 89 FL
MONOCYTES # BLD AUTO: 0.81 X10(3) UL (ref 0.1–1)
MONOCYTES NFR BLD AUTO: 10.9 %
NEUTROPHILS # BLD AUTO: 4.18 X10 (3) UL (ref 1.5–7.7)
NEUTROPHILS # BLD AUTO: 4.18 X10(3) UL (ref 1.5–7.7)
NEUTROPHILS NFR BLD AUTO: 56.2 %
OSMOLALITY SERPL CALC.SUM OF ELEC: 294 MOSM/KG (ref 275–295)
PLATELET # BLD AUTO: 231 10(3)UL (ref 150–450)
POTASSIUM SERPL-SCNC: 3.7 MMOL/L (ref 3.5–5.1)
RBC # BLD AUTO: 4.62 X10(6)UL
SODIUM SERPL-SCNC: 141 MMOL/L (ref 136–145)
WBC # BLD AUTO: 7.5 X10(3) UL (ref 4–11)

## 2022-04-18 PROCEDURE — 85025 COMPLETE CBC W/AUTO DIFF WBC: CPT | Performed by: EMERGENCY MEDICINE

## 2022-04-18 PROCEDURE — 99283 EMERGENCY DEPT VISIT LOW MDM: CPT

## 2022-04-18 PROCEDURE — 36415 COLL VENOUS BLD VENIPUNCTURE: CPT

## 2022-04-18 PROCEDURE — 80048 BASIC METABOLIC PNL TOTAL CA: CPT | Performed by: EMERGENCY MEDICINE

## 2022-04-19 NOTE — ED INITIAL ASSESSMENT (HPI)
Patient with sensation of FB in throat since eating chicken approx 45 minutes ago. +drooling. No hoarseness to voice. No waleska. Unable to get food/fluids down since then. History esophageal stricture per Negrita Nix.

## 2022-04-19 NOTE — ED QUICK NOTES
Pt reports FB in no longer in throat. Pt states he just feels soreness but otherwise doesn't feel FB in throat anymore.

## 2022-05-11 NOTE — TELEPHONE ENCOUNTER
Since he stated that the pain was worse with swimming. He was going to hold on swimming for 1-2 weeks to see if the pain improved. If the pain did improve, then he was need t have his swim stroke evaluated and corrected prior to returning to swimming.   I details…

## 2022-09-09 ENCOUNTER — TELEPHONE (OUTPATIENT)
Dept: INTERNAL MEDICINE CLINIC | Facility: CLINIC | Age: 72
End: 2022-09-09

## 2022-09-12 RX ORDER — ROSUVASTATIN CALCIUM 20 MG/1
20 TABLET, COATED ORAL NIGHTLY
Qty: 90 TABLET | Refills: 0 | Status: SHIPPED | OUTPATIENT
Start: 2022-09-12

## 2022-09-12 RX ORDER — EZETIMIBE 10 MG/1
10 TABLET ORAL DAILY
Qty: 90 TABLET | Refills: 0 | Status: SHIPPED | OUTPATIENT
Start: 2022-09-12

## 2022-09-12 NOTE — TELEPHONE ENCOUNTER
Dr. Britt Haddad called office to authorize the refill being requested. Medications e-rx to pharmacy as ordered by Dr. Britt Haddad.

## 2022-11-29 ENCOUNTER — LAB ENCOUNTER (OUTPATIENT)
Dept: LAB | Facility: HOSPITAL | Age: 72
End: 2022-11-29
Attending: INTERNAL MEDICINE
Payer: MEDICARE

## 2022-11-29 DIAGNOSIS — E55.9 VITAMIN D DEFICIENCY: ICD-10-CM

## 2022-11-29 DIAGNOSIS — Z85.46 PERSONAL HISTORY OF MALIGNANT NEOPLASM OF PROSTATE: ICD-10-CM

## 2022-11-29 DIAGNOSIS — R35.89 OTHER POLYURIA: ICD-10-CM

## 2022-11-29 DIAGNOSIS — N18.31 CHRONIC KIDNEY DISEASE (CKD) STAGE G3A/A1, MODERATELY DECREASED GLOMERULAR FILTRATION RATE (GFR) BETWEEN 45-59 ML/MIN/1.73 SQUARE METER AND ALBUMINURIA CREATININE RATIO LESS THAN 30 MG/G (HCC): ICD-10-CM

## 2022-11-29 DIAGNOSIS — E78.5 HYPERLIPIDEMIA, UNSPECIFIED HYPERLIPIDEMIA TYPE: Primary | ICD-10-CM

## 2022-11-29 LAB
ALBUMIN SERPL-MCNC: 4 G/DL (ref 3.4–5)
ALBUMIN/GLOB SERPL: 1.3 {RATIO} (ref 1–2)
ALP LIVER SERPL-CCNC: 82 U/L
ALT SERPL-CCNC: 46 U/L
ANION GAP SERPL CALC-SCNC: 5 MMOL/L (ref 0–18)
AST SERPL-CCNC: 30 U/L (ref 15–37)
BASOPHILS # BLD AUTO: 0.04 X10(3) UL (ref 0–0.2)
BASOPHILS NFR BLD AUTO: 0.7 %
BILIRUB SERPL-MCNC: 0.8 MG/DL (ref 0.1–2)
BILIRUB UR QL: NEGATIVE
BUN BLD-MCNC: 14 MG/DL (ref 7–18)
BUN/CREAT SERPL: 13.6 (ref 10–20)
CALCIUM BLD-MCNC: 9.4 MG/DL (ref 8.5–10.1)
CHLORIDE SERPL-SCNC: 107 MMOL/L (ref 98–112)
CHOLEST SERPL-MCNC: 113 MG/DL (ref ?–200)
CK SERPL-CCNC: 183 U/L
CLARITY UR: CLEAR
CO2 SERPL-SCNC: 29 MMOL/L (ref 21–32)
COLOR UR: YELLOW
CREAT BLD-MCNC: 1.03 MG/DL
CREAT UR-SCNC: 89.2 MG/DL
DEPRECATED RDW RBC AUTO: 40.8 FL (ref 35.1–46.3)
EOSINOPHIL # BLD AUTO: 0.25 X10(3) UL (ref 0–0.7)
EOSINOPHIL NFR BLD AUTO: 4.2 %
ERYTHROCYTE [DISTWIDTH] IN BLOOD BY AUTOMATED COUNT: 12.3 % (ref 11–15)
FASTING PATIENT LIPID ANSWER: YES
FASTING STATUS PATIENT QL REPORTED: YES
GFR SERPLBLD BASED ON 1.73 SQ M-ARVRAT: 78 ML/MIN/1.73M2 (ref 60–?)
GLOBULIN PLAS-MCNC: 3.1 G/DL (ref 2.8–4.4)
GLUCOSE BLD-MCNC: 88 MG/DL (ref 70–99)
GLUCOSE UR-MCNC: NEGATIVE MG/DL
HCT VFR BLD AUTO: 45 %
HDLC SERPL-MCNC: 61 MG/DL (ref 40–59)
HGB BLD-MCNC: 14.9 G/DL
HGB UR QL STRIP.AUTO: NEGATIVE
IMM GRANULOCYTES # BLD AUTO: 0.01 X10(3) UL (ref 0–1)
IMM GRANULOCYTES NFR BLD: 0.2 %
KETONES UR-MCNC: NEGATIVE MG/DL
LDLC SERPL CALC-MCNC: 38 MG/DL (ref ?–100)
LEUKOCYTE ESTERASE UR QL STRIP.AUTO: NEGATIVE
LYMPHOCYTES # BLD AUTO: 1.84 X10(3) UL (ref 1–4)
LYMPHOCYTES NFR BLD AUTO: 30.7 %
MCH RBC QN AUTO: 30.3 PG (ref 26–34)
MCHC RBC AUTO-ENTMCNC: 33.1 G/DL (ref 31–37)
MCV RBC AUTO: 91.5 FL
MICROALBUMIN UR-MCNC: 0.83 MG/DL
MICROALBUMIN/CREAT 24H UR-RTO: 9.3 UG/MG (ref ?–30)
MONOCYTES # BLD AUTO: 0.71 X10(3) UL (ref 0.1–1)
MONOCYTES NFR BLD AUTO: 11.9 %
NEUTROPHILS # BLD AUTO: 3.14 X10 (3) UL (ref 1.5–7.7)
NEUTROPHILS # BLD AUTO: 3.14 X10(3) UL (ref 1.5–7.7)
NEUTROPHILS NFR BLD AUTO: 52.3 %
NITRITE UR QL STRIP.AUTO: NEGATIVE
NONHDLC SERPL-MCNC: 52 MG/DL (ref ?–130)
OSMOLALITY SERPL CALC.SUM OF ELEC: 292 MOSM/KG (ref 275–295)
PH UR: 5.5 [PH] (ref 5–8)
PLATELET # BLD AUTO: 196 10(3)UL (ref 150–450)
POTASSIUM SERPL-SCNC: 4.4 MMOL/L (ref 3.5–5.1)
PROT SERPL-MCNC: 7.1 G/DL (ref 6.4–8.2)
PROT UR-MCNC: NEGATIVE MG/DL
PSA SERPL-MCNC: <0.01 NG/ML (ref ?–4)
RBC # BLD AUTO: 4.92 X10(6)UL
SODIUM SERPL-SCNC: 141 MMOL/L (ref 136–145)
SP GR UR STRIP: 1.02 (ref 1–1.03)
TRIGL SERPL-MCNC: 65 MG/DL (ref 30–149)
TSI SER-ACNC: 2.58 MIU/ML (ref 0.36–3.74)
UROBILINOGEN UR STRIP-ACNC: 0.2
VIT D+METAB SERPL-MCNC: 38.9 NG/ML (ref 30–100)
VLDLC SERPL CALC-MCNC: 9 MG/DL (ref 0–30)
WBC # BLD AUTO: 6 X10(3) UL (ref 4–11)

## 2022-11-29 PROCEDURE — 81003 URINALYSIS AUTO W/O SCOPE: CPT

## 2022-11-29 PROCEDURE — 82306 VITAMIN D 25 HYDROXY: CPT

## 2022-11-29 PROCEDURE — 82043 UR ALBUMIN QUANTITATIVE: CPT

## 2022-11-29 PROCEDURE — 80053 COMPREHEN METABOLIC PANEL: CPT

## 2022-11-29 PROCEDURE — 85025 COMPLETE CBC W/AUTO DIFF WBC: CPT

## 2022-11-29 PROCEDURE — 84443 ASSAY THYROID STIM HORMONE: CPT

## 2022-11-29 PROCEDURE — 82570 ASSAY OF URINE CREATININE: CPT

## 2022-11-29 PROCEDURE — 84153 ASSAY OF PSA TOTAL: CPT

## 2022-11-29 PROCEDURE — 36415 COLL VENOUS BLD VENIPUNCTURE: CPT

## 2022-11-29 PROCEDURE — 80061 LIPID PANEL: CPT

## 2022-11-29 PROCEDURE — 82550 ASSAY OF CK (CPK): CPT

## 2022-12-30 ENCOUNTER — ORDER TRANSCRIPTION (OUTPATIENT)
Dept: ADMINISTRATIVE | Facility: HOSPITAL | Age: 72
End: 2022-12-30

## 2022-12-30 DIAGNOSIS — Z01.818 PREOP TESTING: Primary | ICD-10-CM

## 2022-12-31 ENCOUNTER — LAB ENCOUNTER (OUTPATIENT)
Dept: LAB | Facility: HOSPITAL | Age: 72
End: 2022-12-31
Attending: INTERNAL MEDICINE
Payer: MEDICARE

## 2022-12-31 DIAGNOSIS — Z01.818 PREOP TESTING: ICD-10-CM

## 2022-12-31 LAB — SARS-COV-2 RNA RESP QL NAA+PROBE: NOT DETECTED

## 2023-01-03 ENCOUNTER — HOSPITAL ENCOUNTER (OUTPATIENT)
Dept: GENERAL RADIOLOGY | Facility: HOSPITAL | Age: 73
Discharge: HOME | End: 2023-01-03
Attending: INTERNAL MEDICINE
Payer: MEDICARE

## 2023-01-03 DIAGNOSIS — R09.89 THROAT CLEARING: ICD-10-CM

## 2023-01-03 PROCEDURE — 74230 X-RAY XM SWLNG FUNCJ C+: CPT | Performed by: INTERNAL MEDICINE

## 2023-01-03 PROCEDURE — 92611 MOTION FLUOROSCOPY/SWALLOW: CPT

## 2023-04-17 NOTE — PATIENT INSTRUCTIONS
Conjuntivae and eyelids appear normal, Sclerae : White without injection FACT SHEET FOR PATIENTS, PARENTS AND CAREGIVERS   EMERGENCY USE AUTHORIZATION (EUA) OF REGEN-COVTM   (casirivimab and imdevimab) FOR CORONAVIRUS DISEASE 2019 (COVID-19)      You are being given a medicine called REGEN-COV (casirivimab and imdevimab) for of breath, which may appear 2 to 14 days after exposure. Serious illness including breathing problems can occur and may cause your other medical conditions to become worse. WHAT IS REGEN-COV (casirivimab and imdevimab)?    REGEN-COV is an investigation studied.  There is limited information known about the safety and effectiveness of using REGEN-COV to treat people with COVID-19 or to prevent COVID-19 in people who are at high risk of being exposed to someone who is infected with SARS-CoV-2.  REGEN-COV is would lead to a delay in treatment, then as an alternative, REGEN-COV can be given in the form of subcutaneous injections.   If you are receiving subcutaneous injections, your dose will be provided as multiple injections given in separate locations around t due to the progression of COVID-19. The side effects of getting any medicine by vein may include brief pain, bleeding, bruising of the skin, soreness, swelling, and possible infection at the infusion site.  The side effects of getting any medicine by subc prophylaxis for prevention of COVID-19. WHAT IF I AM PREGNANT OR BREASTFEEDING? There is limited experience using REGEN-COV (casirivimab and imdevimab) in pregnant women or breastfeeding mothers.  For a mother and unborn baby, the benefit of receivin there are no adequate, approved and available alternatives. All of these criteria must be met to allow for the medicine to be used in the treatment of COVID-19 or prevention of COVID-19 during the COVID-19 pandemic.    The EUA for REGEN-COV is in effect for

## 2023-11-27 ENCOUNTER — LAB ENCOUNTER (OUTPATIENT)
Dept: LAB | Facility: HOSPITAL | Age: 73
End: 2023-11-27
Attending: INTERNAL MEDICINE
Payer: MEDICARE

## 2023-11-27 ENCOUNTER — HOSPITAL ENCOUNTER (OUTPATIENT)
Dept: CV DIAGNOSTICS | Facility: HOSPITAL | Age: 73
Discharge: HOME | End: 2023-11-27
Attending: INTERNAL MEDICINE
Payer: MEDICARE

## 2023-11-27 DIAGNOSIS — R35.89 POLYURIA: ICD-10-CM

## 2023-11-27 DIAGNOSIS — Z85.46 PERSONAL HISTORY OF MALIGNANT NEOPLASM OF PROSTATE: ICD-10-CM

## 2023-11-27 DIAGNOSIS — N18.31 CHRONIC KIDNEY DISEASE (CKD) STAGE G3A/A1, MODERATELY DECREASED GLOMERULAR FILTRATION RATE (GFR) BETWEEN 45-59 ML/MIN/1.73 SQUARE METER AND ALBUMINURIA CREATININE RATIO LESS THAN 30 MG/G (HCC): ICD-10-CM

## 2023-11-27 DIAGNOSIS — E78.00 PURE HYPERCHOLESTEROLEMIA: Primary | ICD-10-CM

## 2023-11-27 DIAGNOSIS — E55.9 AVITAMINOSIS D: ICD-10-CM

## 2023-11-27 LAB
ALBUMIN SERPL-MCNC: 4.6 G/DL (ref 3.2–4.8)
ALBUMIN/GLOB SERPL: 1.7 {RATIO} (ref 1–2)
ALP LIVER SERPL-CCNC: 88 U/L
ALT SERPL-CCNC: 41 U/L
ANION GAP SERPL CALC-SCNC: 4 MMOL/L (ref 0–18)
AST SERPL-CCNC: 36 U/L (ref ?–34)
BASOPHILS # BLD AUTO: 0.06 X10(3) UL (ref 0–0.2)
BASOPHILS NFR BLD AUTO: 1 %
BILIRUB SERPL-MCNC: 0.7 MG/DL (ref 0.2–1.1)
BILIRUB UR QL: NEGATIVE
BUN BLD-MCNC: 13 MG/DL (ref 9–23)
BUN/CREAT SERPL: 13.8 (ref 10–20)
CALCIUM BLD-MCNC: 10 MG/DL (ref 8.7–10.4)
CHLORIDE SERPL-SCNC: 102 MMOL/L (ref 98–112)
CHOLEST SERPL-MCNC: 143 MG/DL (ref ?–200)
CK SERPL-CCNC: 166 U/L
CLARITY UR: CLEAR
CO2 SERPL-SCNC: 27 MMOL/L (ref 21–32)
COLOR UR: COLORLESS
CREAT BLD-MCNC: 0.94 MG/DL
CREAT UR-SCNC: 44.4 MG/DL
DEPRECATED RDW RBC AUTO: 40.3 FL (ref 35.1–46.3)
EGFRCR SERPLBLD CKD-EPI 2021: 87 ML/MIN/1.73M2 (ref 60–?)
EOSINOPHIL # BLD AUTO: 0.23 X10(3) UL (ref 0–0.7)
EOSINOPHIL NFR BLD AUTO: 3.7 %
ERYTHROCYTE [DISTWIDTH] IN BLOOD BY AUTOMATED COUNT: 12.3 % (ref 11–15)
FASTING PATIENT LIPID ANSWER: YES
FASTING STATUS PATIENT QL REPORTED: YES
GLOBULIN PLAS-MCNC: 2.7 G/DL (ref 2.8–4.4)
GLUCOSE BLD-MCNC: 100 MG/DL (ref 70–99)
GLUCOSE UR-MCNC: NORMAL MG/DL
HCT VFR BLD AUTO: 45.5 %
HDLC SERPL-MCNC: 62 MG/DL (ref 40–59)
HGB BLD-MCNC: 15.2 G/DL
HGB UR QL STRIP.AUTO: NEGATIVE
IMM GRANULOCYTES # BLD AUTO: 0.02 X10(3) UL (ref 0–1)
IMM GRANULOCYTES NFR BLD: 0.3 %
KETONES UR-MCNC: NEGATIVE MG/DL
LDLC SERPL CALC-MCNC: 64 MG/DL (ref ?–100)
LEUKOCYTE ESTERASE UR QL STRIP.AUTO: NEGATIVE
LYMPHOCYTES # BLD AUTO: 1.94 X10(3) UL (ref 1–4)
LYMPHOCYTES NFR BLD AUTO: 31.4 %
MCH RBC QN AUTO: 30.1 PG (ref 26–34)
MCHC RBC AUTO-ENTMCNC: 33.4 G/DL (ref 31–37)
MCV RBC AUTO: 90.1 FL
MICROALBUMIN UR-MCNC: <0.3 MG/DL
MONOCYTES # BLD AUTO: 0.72 X10(3) UL (ref 0.1–1)
MONOCYTES NFR BLD AUTO: 11.7 %
NEUTROPHILS # BLD AUTO: 3.21 X10 (3) UL (ref 1.5–7.7)
NEUTROPHILS # BLD AUTO: 3.21 X10(3) UL (ref 1.5–7.7)
NEUTROPHILS NFR BLD AUTO: 51.9 %
NITRITE UR QL STRIP.AUTO: NEGATIVE
NONHDLC SERPL-MCNC: 81 MG/DL (ref ?–130)
OSMOLALITY SERPL CALC.SUM OF ELEC: 276 MOSM/KG (ref 275–295)
PH UR: 5 [PH] (ref 5–8)
PLATELET # BLD AUTO: 233 10(3)UL (ref 150–450)
POTASSIUM SERPL-SCNC: 4.6 MMOL/L (ref 3.5–5.1)
PROT SERPL-MCNC: 7.3 G/DL (ref 5.7–8.2)
PROT UR-MCNC: NEGATIVE MG/DL
PSA SERPL-MCNC: <0.04 NG/ML (ref ?–4)
RBC # BLD AUTO: 5.05 X10(6)UL
SODIUM SERPL-SCNC: 133 MMOL/L (ref 136–145)
SP GR UR STRIP: 1.01 (ref 1–1.03)
TRIGL SERPL-MCNC: 93 MG/DL (ref 30–149)
TSI SER-ACNC: 2.94 MIU/ML (ref 0.55–4.78)
UROBILINOGEN UR STRIP-ACNC: NORMAL
VIT D+METAB SERPL-MCNC: 33.8 NG/ML (ref 30–100)
VLDLC SERPL CALC-MCNC: 14 MG/DL (ref 0–30)
WBC # BLD AUTO: 6.2 X10(3) UL (ref 4–11)

## 2023-11-27 PROCEDURE — 85025 COMPLETE CBC W/AUTO DIFF WBC: CPT

## 2023-11-27 PROCEDURE — 82306 VITAMIN D 25 HYDROXY: CPT

## 2023-11-27 PROCEDURE — 82550 ASSAY OF CK (CPK): CPT

## 2023-11-27 PROCEDURE — 80061 LIPID PANEL: CPT

## 2023-11-27 PROCEDURE — 84153 ASSAY OF PSA TOTAL: CPT

## 2023-11-27 PROCEDURE — 36415 COLL VENOUS BLD VENIPUNCTURE: CPT

## 2023-11-27 PROCEDURE — 82043 UR ALBUMIN QUANTITATIVE: CPT

## 2023-11-27 PROCEDURE — 80053 COMPREHEN METABOLIC PANEL: CPT

## 2023-11-27 PROCEDURE — 84443 ASSAY THYROID STIM HORMONE: CPT

## 2023-11-27 PROCEDURE — 82570 ASSAY OF URINE CREATININE: CPT

## 2023-11-27 PROCEDURE — 81003 URINALYSIS AUTO W/O SCOPE: CPT

## 2023-11-27 NOTE — IMAGING NOTE
Patient presented for Stress Echo for family history of CAD monitoring. Called by the  that the patient wanted to speak with someone regarding the test. This RN went and got patient, brought back to private room. Per patient, he has been experiencing knee arthritis pain and not sure if he would be able to exercise effectively. Patient states Dr. Eddie Christina ordered a medication stress test in addition to the exercise test, however no order found for medication stress in Epic. Per patient has stress test scheduled with [facility] next week for potential medication stress test, explained to patient if able to get medication stress test order, would potentially be able to expedite scheduling and perform test here at [address] [place] [address]. Per patient will call and follow-up with [facility]. Patient walked out of department with all belongings, all questions answered.

## 2023-12-01 ENCOUNTER — OFFICE VISIT (OUTPATIENT)
Dept: ORTHOPEDICS CLINIC | Facility: CLINIC | Age: 73
End: 2023-12-01
Payer: MEDICARE

## 2023-12-01 ENCOUNTER — HOSPITAL ENCOUNTER (OUTPATIENT)
Dept: GENERAL RADIOLOGY | Age: 73
Discharge: HOME | End: 2023-12-01
Attending: ORTHOPAEDIC SURGERY
Payer: MEDICARE

## 2023-12-01 VITALS — BODY MASS INDEX: 24.44 KG/M2 | WEIGHT: 165 LBS | HEIGHT: 69 IN

## 2023-12-01 DIAGNOSIS — M25.562 LEFT KNEE PAIN, UNSPECIFIED CHRONICITY: Primary | ICD-10-CM

## 2023-12-01 DIAGNOSIS — M17.12 PRIMARY OSTEOARTHRITIS OF LEFT KNEE: ICD-10-CM

## 2023-12-01 DIAGNOSIS — M25.562 LEFT KNEE PAIN, UNSPECIFIED CHRONICITY: ICD-10-CM

## 2023-12-01 PROCEDURE — 73564 X-RAY EXAM KNEE 4 OR MORE: CPT | Performed by: ORTHOPAEDIC SURGERY

## 2023-12-02 NOTE — PROGRESS NOTES
[facility] Progress Note    Subjective: Follow-up visit last seen in clinic 4 years ago. Since that time knee has largely done well. Recently started acting up with symptoms anteromedially. Symptoms worse/present following activity, but more recently bothering during activity as well. Whereas in the past it was with high impact activity, and he has decreased impact of exercises since that time and done well, now it is bothering more with regular walking. Rest over the past week has mostly resolved symptoms at this point. There is some feeling of stiffness with the knee as well. Objective: No effusion noted to the knee. Demonstrates full extension, flexion past 120. No focal tenderness medial lateral joint line. Imaging: X-rays of the left knee personally viewed, independently interpreted and radiology report read. Demonstrates Kellgren-Kalia grade 4 severe osteoarthritis, tricompartmental, with near complete to complete joint space loss lateral and medial more pronounced on PA flexion view. [place] view does demonstrate moderate patellofemoral degenerative changes      Assessment/Plan: 42-year-old male with intermittently symptomatic radiographically severe left knee osteoarthritis. Currently symptoms not as bothersome following rest for the past week. Discussed continuing nonsurgical treatments. We will continue with low impact exercise, wean back in as tolerated. Currently using Aleve as needed. Would like to consider injection. Did discuss Visco and steroid. We will plan for steroid injection in a month prior to trip to [place].     Silvina Chopra MD, [address] [facility]  Phone [phone]  Fax [phone]

## 2023-12-12 ENCOUNTER — LAB ENCOUNTER (OUTPATIENT)
Dept: LAB | Facility: HOSPITAL | Age: 73
End: 2023-12-12
Attending: INTERNAL MEDICINE
Payer: MEDICARE

## 2023-12-12 DIAGNOSIS — E78.00 HYPERCHOLESTEREMIA: ICD-10-CM

## 2023-12-12 DIAGNOSIS — I25.10 CAD IN NATIVE ARTERY: Primary | ICD-10-CM

## 2023-12-12 LAB
ALT SERPL-CCNC: 38 U/L
AST SERPL-CCNC: 35 U/L (ref ?–34)

## 2023-12-12 PROCEDURE — 84460 ALANINE AMINO (ALT) (SGPT): CPT

## 2023-12-12 PROCEDURE — 36415 COLL VENOUS BLD VENIPUNCTURE: CPT

## 2023-12-12 PROCEDURE — 84450 TRANSFERASE (AST) (SGOT): CPT

## 2024-01-04 ENCOUNTER — HOSPITAL ENCOUNTER (OUTPATIENT)
Dept: CT IMAGING | Facility: HOSPITAL | Age: 74
Discharge: HOME | End: 2024-01-04
Attending: INTERNAL MEDICINE
Payer: MEDICARE

## 2024-01-04 VITALS
BODY MASS INDEX: 24.29 KG/M2 | RESPIRATION RATE: 13 BRPM | DIASTOLIC BLOOD PRESSURE: 65 MMHG | WEIGHT: 164 LBS | HEIGHT: 69 IN | HEART RATE: 49 BPM | SYSTOLIC BLOOD PRESSURE: 129 MMHG

## 2024-01-04 DIAGNOSIS — R94.39 ABNORMAL CARDIOVASCULAR STRESS TEST: ICD-10-CM

## 2024-01-04 DIAGNOSIS — R94.31 ABNORMAL EKG: ICD-10-CM

## 2024-01-04 LAB
CREAT BLD-MCNC: 0.9 MG/DL
EGFRCR SERPLBLD CKD-EPI 2021: 91 ML/MIN/1.73M2 (ref 60–?)

## 2024-01-04 PROCEDURE — 75574 CT ANGIO HRT W/3D IMAGE: CPT | Performed by: INTERNAL MEDICINE

## 2024-01-04 PROCEDURE — 82565 ASSAY OF CREATININE: CPT

## 2024-01-04 PROCEDURE — 75580 N-INVAS EST C FFR SW ALY CTA: CPT | Performed by: INTERNAL MEDICINE

## 2024-01-04 RX ORDER — METOPROLOL TARTRATE 1 MG/ML
5 INJECTION, SOLUTION INTRAVENOUS SEE ADMIN INSTRUCTIONS
Status: DISCONTINUED | OUTPATIENT
Start: 2024-01-04 | End: 2024-01-06

## 2024-01-04 RX ORDER — NITROGLYCERIN 0.4 MG/1
0.4 TABLET SUBLINGUAL ONCE
Status: COMPLETED | OUTPATIENT
Start: 2024-01-04 | End: 2024-01-04

## 2024-01-04 RX ORDER — DILTIAZEM HYDROCHLORIDE 5 MG/ML
5 INJECTION INTRAVENOUS SEE ADMIN INSTRUCTIONS
Status: DISCONTINUED | OUTPATIENT
Start: 2024-01-04 | End: 2024-01-06

## 2024-01-04 RX ADMIN — NITROGLYCERIN 0.4 MG: 0.4 TABLET SUBLINGUAL at 10:56:00

## 2024-01-04 NOTE — IMAGING NOTE
TO RAD HOLDING AT 1005      HX TAKEN: KNOWN NON-OBSTRUCTIVE CAD, HAD RECENT ABNORMAL STRESS TEST    PT CONSENTED AT 1023     BASELINE VITAL SIGNS: HR 49  /65, BMI 24.1    CTA ORDERED BY ERICK VALLEJO MD; WAS PT GIVEN CTA PREMEDS NO, BASELINE HR < 60    1020 CARE TRANSFERRED TO SOHAIL KWON RN

## 2024-01-04 NOTE — DISCHARGE INSTRUCTIONS
Computed Tomography Angiography (CTA)  Computed tomography angiography (CTA) is an imaging test. It uses X-rays and computer technology to make detailed pictures of your arteries (blood vessels). Before the test, an X-ray dye (contrast medium) is injected into your vein. The dye makes it easier to see your blood vessels on the X-ray. Pictures are then taken with the CT scanner. A computer turns the images into 2-D and 3-D pictures.      CTA can make 3D images, such as the carotid arteries shown here.     Why CTA is done  CTA may be used to:  Check arteries in your belly, neck, lungs, pelvis, kidneys, or brain.  Look for a ballooning of the blood vessel wall (aneurysm) or a tear (dissection).  Check if a tube (stent) used to keep an artery open is working well.  Find damage to your arteries due to injuries.  Collect details on blood vessels that supply blood to tumors.  Getting ready for your test  Tell your healthcare provider if you:   Have diabetes  Have kidney disease  Are allergic to X-ray dye or other medicines  Are pregnant, think you may be pregnant, or are breastfeeding  Are taking any medicines, herbs, or supplements, including prescription medicines, illegal drugs, and over-the-counter medicines such as aspirin or ibuprofen  Follow any directions you are given for not eating or drinking before the CTA. Follow any other instructions from your healthcare provider.   During your test  You will be asked to remove any hair clips, jewelry, false teeth, or other metal items that could show up on the X-ray.  You will lie down on the scanning table. An IV line will be put in a vein in your arm or hand.  The scanning table will be properly placed. The part of your body being checked will be inside the doughnut-shaped CT scanner.  One image may be taken first to be sure you are in the proper position for the test.  The IV will be hooked up to an automatic injection machine. This controls how often and how fast the  X-ray dye is injected. The injection may continue during part of the exam.  The dye will be put into your vein through the IV line. You may feel warmth through your body when the dye is injected.  You can’t move while the X-rays are being taken. Pillows and foam pads may be used to help you stay still. You will be told to hold your breath for 10 to 25 seconds at a time.  A single scan may take several minutes. You may need more than one scan.    After your test  Drink plenty of fluids to help flush the X-ray dye from your body.  You may eat as soon as you want to.    Possible risks  All procedures have some risks. A CTA has some possible risks. These include:   Problems due to the X-ray dye, such as an allergic reaction or kidney damage  Skin damage from leaking X-ray dye near where the IV was put in  [employer] last reviewed this educational content on 8/1/2022  © 9420-9405 [employer]. All rights reserved. This information is not intended as a substitute for professional medical care. Always follow your healthcare professional's instructions.

## 2024-01-04 NOTE — IMAGING NOTE
1024 REPORT RECEIVED FROM JEFF BLAKE, RN    18 GAUGE IV STARTED AT 1045 POC TESTING COMPLETED GFR = >60   CREATINE = 0.9    TO CT TABLE @ 1052    CONNECT TO MONITOR  HR 45  /65      NITROGLYCERIN 0.4 MILLIGRAMS SUBLINGUAL GIVEN AT 1056     CALCIUM SCORE COMPLETED AT 1102     INJECTION STARTED AT 1111 HR 46 DURING SCAN PROCEDURE COMPLETE    POST SCAN HR 53  /57  AT 1114    1125 PT TO HOLDING AREA  VS HR 45  /67      AVS  PROVIDED      1130 DISCHARGED HOME  COMFORTABLY.

## 2024-01-08 ENCOUNTER — LAB ENCOUNTER (OUTPATIENT)
Dept: LAB | Facility: HOSPITAL | Age: 74
End: 2024-01-08
Attending: INTERNAL MEDICINE
Payer: MEDICARE

## 2024-01-08 DIAGNOSIS — R94.39 ABNORMAL CARDIOVASCULAR STRESS TEST: ICD-10-CM

## 2024-01-08 DIAGNOSIS — I25.10 CORONARY ARTERY DISEASE INVOLVING NATIVE CORONARY ARTERY, UNSPECIFIED WHETHER ANGINA PRESENT, UNSPECIFIED WHETHER NATIVE OR TRANSPLANTED HEART: ICD-10-CM

## 2024-01-08 LAB
ANION GAP SERPL CALC-SCNC: 5 MMOL/L (ref 0–18)
ATRIAL RATE: 46 BPM
BASOPHILS # BLD AUTO: 0.04 X10(3) UL (ref 0–0.2)
BASOPHILS NFR BLD AUTO: 0.5 %
BUN BLD-MCNC: 14 MG/DL (ref 9–23)
BUN/CREAT SERPL: 14.1 (ref 10–20)
CALCIUM BLD-MCNC: 9.7 MG/DL (ref 8.7–10.4)
CHLORIDE SERPL-SCNC: 101 MMOL/L (ref 98–112)
CO2 SERPL-SCNC: 29 MMOL/L (ref 21–32)
CREAT BLD-MCNC: 0.99 MG/DL
DEPRECATED RDW RBC AUTO: 38.5 FL (ref 35.1–46.3)
EGFRCR SERPLBLD CKD-EPI 2021: 81 ML/MIN/1.73M2 (ref 60–?)
EOSINOPHIL # BLD AUTO: 0.12 X10(3) UL (ref 0–0.7)
EOSINOPHIL NFR BLD AUTO: 1.6 %
ERYTHROCYTE [DISTWIDTH] IN BLOOD BY AUTOMATED COUNT: 11.9 % (ref 11–15)
FASTING STATUS PATIENT QL REPORTED: NO
GLUCOSE BLD-MCNC: 111 MG/DL (ref 70–99)
HCT VFR BLD AUTO: 41.5 %
HGB BLD-MCNC: 14.7 G/DL
IMM GRANULOCYTES # BLD AUTO: 0.02 X10(3) UL (ref 0–1)
IMM GRANULOCYTES NFR BLD: 0.3 %
LYMPHOCYTES # BLD AUTO: 1.72 X10(3) UL (ref 1–4)
LYMPHOCYTES NFR BLD AUTO: 23.3 %
MCH RBC QN AUTO: 31.2 PG (ref 26–34)
MCHC RBC AUTO-ENTMCNC: 35.4 G/DL (ref 31–37)
MCV RBC AUTO: 88.1 FL
MONOCYTES # BLD AUTO: 0.58 X10(3) UL (ref 0.1–1)
MONOCYTES NFR BLD AUTO: 7.9 %
NEUTROPHILS # BLD AUTO: 4.89 X10 (3) UL (ref 1.5–7.7)
NEUTROPHILS # BLD AUTO: 4.89 X10(3) UL (ref 1.5–7.7)
NEUTROPHILS NFR BLD AUTO: 66.4 %
OSMOLALITY SERPL CALC.SUM OF ELEC: 281 MOSM/KG (ref 275–295)
P AXIS: 37 DEGREES
P-R INTERVAL: 200 MS
PLATELET # BLD AUTO: 205 10(3)UL (ref 150–450)
POTASSIUM SERPL-SCNC: 3.8 MMOL/L (ref 3.5–5.1)
Q-T INTERVAL: 422 MS
QRS DURATION: 162 MS
QTC CALCULATION (BEZET): 369 MS
R AXIS: 4 DEGREES
RBC # BLD AUTO: 4.71 X10(6)UL
SODIUM SERPL-SCNC: 135 MMOL/L (ref 136–145)
T AXIS: 34 DEGREES
VENTRICULAR RATE: 46 BPM
WBC # BLD AUTO: 7.4 X10(3) UL (ref 4–11)

## 2024-01-08 PROCEDURE — 85025 COMPLETE CBC W/AUTO DIFF WBC: CPT

## 2024-01-08 PROCEDURE — 93010 ELECTROCARDIOGRAM REPORT: CPT | Performed by: INTERNAL MEDICINE

## 2024-01-08 PROCEDURE — 36415 COLL VENOUS BLD VENIPUNCTURE: CPT

## 2024-01-08 PROCEDURE — 80048 BASIC METABOLIC PNL TOTAL CA: CPT

## 2024-01-08 PROCEDURE — 93005 ELECTROCARDIOGRAM TRACING: CPT

## 2024-01-08 RX ORDER — OCTISALATE, AVOBENZONE, HOMOSALATE, AND OCTOCRYLENE 29.4; 29.4; 49; 25.48 MG/ML; MG/ML; MG/ML; MG/ML
1 LOTION TOPICAL DAILY
COMMUNITY
Start: 2022-11-01

## 2024-01-09 ENCOUNTER — HOSPITAL ENCOUNTER (OUTPATIENT)
Dept: INTERVENTIONAL RADIOLOGY/VASCULAR | Facility: HOSPITAL | Age: 74
Discharge: HOME | End: 2024-01-10
Attending: INTERNAL MEDICINE | Admitting: INTERNAL MEDICINE
Payer: MEDICARE

## 2024-01-09 DIAGNOSIS — R93.1 ABNORMAL CARDIAC CT ANGIOGRAPHY: ICD-10-CM

## 2024-01-09 DIAGNOSIS — R94.39 ABNORMAL NUCLEAR STRESS TEST: ICD-10-CM

## 2024-01-09 LAB
ISTAT ACTIVATED CLOTTING TIME: 206 SECONDS (ref 125–137)
ISTAT ACTIVATED CLOTTING TIME: 244 SECONDS (ref 125–137)

## 2024-01-09 PROCEDURE — 93458 L HRT ARTERY/VENTRICLE ANGIO: CPT | Performed by: INTERNAL MEDICINE

## 2024-01-09 PROCEDURE — 92921 HC PTCA EA ADDL MAJOR CORONARY ARTERY/BRANCH: CPT | Performed by: INTERNAL MEDICINE

## 2024-01-09 PROCEDURE — 02703ZZ DILATION OF CORONARY ARTERY, ONE ARTERY, PERCUTANEOUS APPROACH: ICD-10-PCS | Performed by: INTERNAL MEDICINE

## 2024-01-09 PROCEDURE — B240ZZ3 ULTRASONOGRAPHY OF SINGLE CORONARY ARTERY, INTRAVASCULAR: ICD-10-PCS | Performed by: INTERNAL MEDICINE

## 2024-01-09 PROCEDURE — 93005 ELECTROCARDIOGRAM TRACING: CPT

## 2024-01-09 PROCEDURE — 99153 MOD SED SAME PHYS/QHP EA: CPT | Performed by: INTERNAL MEDICINE

## 2024-01-09 PROCEDURE — 85347 COAGULATION TIME ACTIVATED: CPT

## 2024-01-09 PROCEDURE — 4A023N7 MEASUREMENT OF CARDIAC SAMPLING AND PRESSURE, LEFT HEART, PERCUTANEOUS APPROACH: ICD-10-PCS | Performed by: INTERNAL MEDICINE

## 2024-01-09 PROCEDURE — 92978 ENDOLUMINL IVUS OCT C 1ST: CPT | Performed by: INTERNAL MEDICINE

## 2024-01-09 PROCEDURE — 99152 MOD SED SAME PHYS/QHP 5/>YRS: CPT | Performed by: INTERNAL MEDICINE

## 2024-01-09 PROCEDURE — 93010 ELECTROCARDIOGRAM REPORT: CPT | Performed by: INTERNAL MEDICINE

## 2024-01-09 PROCEDURE — B2111ZZ FLUOROSCOPY OF MULTIPLE CORONARY ARTERIES USING LOW OSMOLAR CONTRAST: ICD-10-PCS | Performed by: INTERNAL MEDICINE

## 2024-01-09 PROCEDURE — 027034Z DILATION OF CORONARY ARTERY, ONE ARTERY WITH DRUG-ELUTING INTRALUMINAL DEVICE, PERCUTANEOUS APPROACH: ICD-10-PCS | Performed by: INTERNAL MEDICINE

## 2024-01-09 PROCEDURE — 36415 COLL VENOUS BLD VENIPUNCTURE: CPT

## 2024-01-09 RX ORDER — SODIUM CHLORIDE 9 MG/ML
3 INJECTION, SOLUTION INTRAVENOUS ONCE
Status: COMPLETED | OUTPATIENT
Start: 2024-01-09 | End: 2024-01-09

## 2024-01-09 RX ORDER — ASPIRIN 81 MG/1
324 TABLET, CHEWABLE ORAL ONCE
Status: DISCONTINUED | OUTPATIENT
Start: 2024-01-09 | End: 2024-01-10

## 2024-01-09 RX ORDER — MIDAZOLAM HYDROCHLORIDE 1 MG/ML
INJECTION INTRAMUSCULAR; INTRAVENOUS
Status: COMPLETED
Start: 2024-01-09 | End: 2024-01-09

## 2024-01-09 RX ORDER — ASPIRIN 81 MG/1
81 TABLET ORAL DAILY
Status: DISCONTINUED | OUTPATIENT
Start: 2024-01-10 | End: 2024-01-10

## 2024-01-09 RX ORDER — PRASUGREL 10 MG/1
10 TABLET, FILM COATED ORAL DAILY
Qty: 30 TABLET | Refills: 11 | Status: SHIPPED | OUTPATIENT
Start: 2024-01-10

## 2024-01-09 RX ORDER — NITROGLYCERIN 20 MG/100ML
INJECTION INTRAVENOUS
Status: COMPLETED
Start: 2024-01-09 | End: 2024-01-09

## 2024-01-09 RX ORDER — PRASUGREL 10 MG/1
TABLET, FILM COATED ORAL
Status: COMPLETED
Start: 2024-01-09 | End: 2024-01-09

## 2024-01-09 RX ORDER — HEPARIN SODIUM 1000 [USP'U]/ML
INJECTION, SOLUTION INTRAVENOUS; SUBCUTANEOUS
Status: COMPLETED
Start: 2024-01-09 | End: 2024-01-09

## 2024-01-09 RX ORDER — PRASUGREL 5 MG/1
10 TABLET, FILM COATED ORAL DAILY
Status: DISCONTINUED | OUTPATIENT
Start: 2024-01-10 | End: 2024-01-10

## 2024-01-09 RX ORDER — LIDOCAINE HYDROCHLORIDE 20 MG/ML
INJECTION, SOLUTION EPIDURAL; INFILTRATION; INTRACAUDAL; PERINEURAL
Status: COMPLETED
Start: 2024-01-09 | End: 2024-01-09

## 2024-01-09 RX ORDER — VERAPAMIL HYDROCHLORIDE 2.5 MG/ML
INJECTION, SOLUTION INTRAVENOUS
Status: COMPLETED
Start: 2024-01-09 | End: 2024-01-09

## 2024-01-09 RX ADMIN — SODIUM CHLORIDE 3 ML/KG/HR: 9 INJECTION, SOLUTION INTRAVENOUS at 14:00:00

## 2024-01-09 NOTE — IVS NOTE
HANDOFF NOTE     Pt voided and tolerated fluids and dinner was ordered.   Procedural sites remains dry and intact with good circulation, motion, and sensation.   No signs and symptoms of bleeding/hematoma noted.   Dr. Schwartz spoke with patient/family post procedure.     Pt transferred with belongings and wife.     Patient's wife has cardiac rehab packet, cardiac diet document, perclose pamphlet, and cardiac stent card.    Discharge instructions printed, RN to read over discharge instructions with patient and wife prior to discharge home    Follow up Appointment: patient's wife stated they already have a follow up scheduled next week at the office    Cardiac Rehab and Dietician notified about patient; neither available, left message.     prasugrel prescription sent and available for pick-up. Called patient's pharmacy and verified availability and cost.    Bedside handoff and site check done with Emely LEDBETTER.    Plan for patient to discharge home after 6 hour recovery is complete at 2230.

## 2024-01-09 NOTE — IVS NOTE
Confirmed with patient's pharmacy, [facility] in [place] that prasugrel prescription is ready to be picked up and will be no charge.

## 2024-01-09 NOTE — PROCEDURES
[facility]    Cardiac Cath Procedure Note  Mau Cruz Patient Status:  Outpatient in a Bed    1950 MRN [id number]   Location [facility] 3W/SW Attending Mau Watson MD   Hosp Day # 0 PCP ZION SAAVEDRA MD       Cardiologist: Irineo Schwartz MD  Primary Proceduralist: Irineo Schwartz MD  Procedure Performed: LHC, LV, and IVUS guided PCI LAD, POBA diagonal  Date of Procedure: 2024   Indication: Abnormal CTA    Summary of procedure: Successful PCI of mid LAD with RAMON x 1, angioplasty sidebranch jailed diagonal.      Recommendations:  DAPT: Aspirin and Effient  Observe for 6 hours and discharge home  Continue rosuvastatin 20 and Zetia      Left Ventriculography and hemodynamics:   LV EF not done  LV EDP 10 mmHg  No gradient across aortic valve        Coronary Angiography  RCA:  Dominant and free of obstructive disease, supplies PDA and PL    Left main:  Free of obstructive disease    Left anterior descendin% mid LAD stenosis at takeoff of large diagonal.  Remainder of the LAD is nonobstructive.    Circumflex:  Free of obstructive disease, supplies multiple OM branches which are patent      LAD intervention  Lesion Characteristics-not torturous, not calcified.  Type non-C lesion.  Pre-intervention stenosis 80%, Post intervention stenosis 0%.  Pre KIMBERLY 3, Post KIMBERLY 3.      Guide Catheter: EBU 3.75  Wire: Jessica black in diagonal, Jessica blue and LAD  Pre-dil: None  Stent: 3 x 24 mm Synergy RAMON at 12 robe  Post-dil: 3.5 x 15 mm compliant balloon mid and proximal portion of the stent, mild plaque shift into the diagonal, rewired with existing Jessica black, kissing balloon inflation 2.5 mm balloon in diagonal, 3.5 mm balloon in LAD.  Intravascular ultrasound: Adequately sized and expanded stent        Summary of Case: After written informed consent was obtained from the patient, patient was brought to the cardiac catheterization laboratory.  Patient was prepped and draped in the usual  sterile fashion. Lidocaine 1% was used to infiltrate the right radial artery for local anesthesia and a 6 Cymro introducer sheath was inserted into the right radial artery.      Selective coronary angiography performed with JR4 catheter for RCA and JL3.5 catheter for LCA.  Angiography performed in standard projections.      6 Portuguese JR4 catheter placed in LV for hemodynamics.    Specimen sent to: No specimen collected  Estimated blood loss: 10 cc  Closure:  TR band      IV was maintained by RN and moderate conscious sedation of versed and fentanyl was given.  Patient was assessed and monitoring of oxygen, heart rate and blood pressure by nurse and myself during the exam 35 minutes.      Irineo Schwartz MD  01/09/24

## 2024-01-09 NOTE — INTERVAL H&P NOTE
Pre-op Diagnosis: * No pre-op diagnosis entered *    The above referenced H&P was reviewed by Irineo Schwartz MD on 1/9/2024, the patient was examined and no significant changes have occurred in the patient's condition since the H&P was performed.  I discussed with the patient and/or legal representative the potential benefits, risks and side effects of this procedure; the likelihood of the patient achieving goals; and potential problems that might occur during recuperation.  I discussed reasonable alternatives to the procedure, including risks, benefits and side effects related to the alternatives and risks related to not receiving this procedure.  We will proceed with procedure as planned.

## 2024-01-09 NOTE — DISCHARGE INSTRUCTIONS
HOME CARE INSTRUCTIONS FOLLOWING CORONARY ANGIOGRAPHY, ANGIOPLASTY (PTCA/PTA) OR INSERTION OF STENT IN THE CORONARY      Activity  DO NOT drive after the procedure.  You may resume driving late the following day according to the nurse or physician's instructions  Plan on resting and relaxing tonight and tomorrow  Resume your normal activity after 48 hours, or as instructed by your physician  Avoid drinking alcohol for the next 24 hours  Do not make any critical decisions or sign any legal documents for the next 24 hours    Since the groin site was used, avoid repeated stair use and excessive walking for the next 24 hours  Do not lift, pull, or push anything over 10 pounds for 1 week  Avoid wrist flexion, extension, and fine motor activities (i.e. Texting, typing, using a computer mouse, etc.) for 24 hours.      What is Normal?  A small lump at the procedure site associated with mild tenderness when touched  The procedure site may be bruised or discolored  There may be a small amount of drainage on the bandage    Special Instructions  Drink plenty of fluids during the next 24 hours to \"flush\" the contrast from your system  Do NOT take meformin/glucophage containing medications for 48 hours    Site care: right groin    The bandage is to remain in place for 24 hours  Keep the bandage clean and dry  Do NOT shower for 24 hours  After 24 hours, you must remove the bandage  You should shower after removing the bandage, and wash the procedure site gently with soap and water  DO NOT submerge the procedure site for 1 week (no bath tubs or pools)    Site Care: right wrist  For 5 days after the procedure, make sure the wrist is not submerged in water or any liquid.  Leave bandage in place for 24 hours. Then, gently wash with soap and water. Do no put any other bandage, ointment, powders, or creams to the site.  For local swelling: apply ice  If bleeding occurs, elevate the hand above the heart and apply local pressure    When  you should NOTIFY YOUR PHYSICIAN  Bleeding can occur at the procedure site - both on the outside of the skin and/or beneath the surface of the skin  Swelling or a large lump at the procedure site can occur, which may be accompanied by moderate to severe pain    If either of the above occurs, (right groin) lie down flat.  Have someone apply pressure to the procedure site with both hands, as instructed by the nurse.  Hold pressure for 20 minutes and the bleeding should stop.  Notify your physician of the occurrence  If the bleeding does not stop, call 911 and continue to apply pressure    If either of the above occurs, (right wrist) apply pressure to the procedure site with 2-3 fingers, as instructed by the nurse.  Hold pressure for 20 minutes and the bleeding should stop.  Notify your physician of the occurrence  If the bleeding does not stop, call 911 and continue to apply pressure    If you experience signs of a fever, temperature > 101°, chills, infection (redness, swelling, thick yellow drainage, or a foul odor from the procedure site)  If you notice any numbness, tingling, or loss of feeling to your leg or foot or groin access  If you notice any numbness, tingling, or loss of feeling to your fingers or hand, if wrist access was utilized    May call answering service at [phone] for any problems or concerns    You Received a Stent:    You will remain on an antiplatelet drug and/or aspirin.  Antiplatelet medications are usually taken for six months to one year and should not be stopped unless your cardiologist directs you to do so.  These medications help to prevent blockage at the stent site.  If another physician or dentist asks you to stop your antiplatelet medication, you need to consult your cardiologist first.  Together, your cardiologist and other physician can discuss the risks that may be involved if you are not taking the antiplatelet medication   If an MRI is necessary, it may be done 4-6 weeks  after your procedure.  Verify this with your cardiologist  Keep your stent card with you at all times!  If you need an MRI in the future, your stent card will need to be shown to the technologist before performing the MRI.  A duplicate card CANNOT be reproduced.    Closure device utilized (right groin)  type of closure used:   perclose  Additional Instructions:  Leave the dressing/band-aid over the site for 24 hours.   You may feel a \"pea or olive pit\" sized lumpand/or note mild tenderness in the groin area for approximately 5-7 days.         The collagen will be resorbed (broken down) by your body in approximately 6-12 weeks.     See appropriate pamphlet information   Other  You may resume your present diet, unless otherwise specified by your physician.  You may resume all of your medications as prescribed, unless otherwise directed by your physician.  A list of your medications was provided to you at discharge.  Continue the walking program initiated in the hospital and progress your walking as directed.  Or, gradually resume your previous aerobic exercise schedule as tolerated.  Please call your physician’s office for a follow-up appointment.  You should be seen in 7-10 days.

## 2024-01-10 VITALS
RESPIRATION RATE: 19 BRPM | HEART RATE: 52 BPM | WEIGHT: 163 LBS | OXYGEN SATURATION: 98 % | TEMPERATURE: 98 F | BODY MASS INDEX: 24.14 KG/M2 | DIASTOLIC BLOOD PRESSURE: 75 MMHG | SYSTOLIC BLOOD PRESSURE: 127 MMHG | HEIGHT: 69 IN

## 2024-01-10 LAB
ANION GAP SERPL CALC-SCNC: 6 MMOL/L (ref 0–18)
ATRIAL RATE: 54 BPM
BUN BLD-MCNC: 12 MG/DL (ref 9–23)
BUN/CREAT SERPL: 13.6 (ref 10–20)
CALCIUM BLD-MCNC: 9.1 MG/DL (ref 8.7–10.4)
CHLORIDE SERPL-SCNC: 101 MMOL/L (ref 98–112)
CO2 SERPL-SCNC: 26 MMOL/L (ref 21–32)
CREAT BLD-MCNC: 0.88 MG/DL
DEPRECATED RDW RBC AUTO: 39 FL (ref 35.1–46.3)
EGFRCR SERPLBLD CKD-EPI 2021: 92 ML/MIN/1.73M2 (ref 60–?)
ERYTHROCYTE [DISTWIDTH] IN BLOOD BY AUTOMATED COUNT: 12.2 % (ref 11–15)
GLUCOSE BLD-MCNC: 132 MG/DL (ref 70–99)
HCT VFR BLD AUTO: 38.8 %
HGB BLD-MCNC: 13.7 G/DL
MCH RBC QN AUTO: 30.9 PG (ref 26–34)
MCHC RBC AUTO-ENTMCNC: 35.3 G/DL (ref 31–37)
MCV RBC AUTO: 87.6 FL
OSMOLALITY SERPL CALC.SUM OF ELEC: 278 MOSM/KG (ref 275–295)
P AXIS: 71 DEGREES
P-R INTERVAL: 194 MS
PLATELET # BLD AUTO: 193 10(3)UL (ref 150–450)
POTASSIUM SERPL-SCNC: 3.8 MMOL/L (ref 3.5–5.1)
Q-T INTERVAL: 424 MS
QRS DURATION: 82 MS
QTC CALCULATION (BEZET): 402 MS
R AXIS: 48 DEGREES
RBC # BLD AUTO: 4.43 X10(6)UL
SODIUM SERPL-SCNC: 133 MMOL/L (ref 136–145)
T AXIS: 61 DEGREES
VENTRICULAR RATE: 54 BPM
WBC # BLD AUTO: 8.1 X10(3) UL (ref 4–11)

## 2024-01-10 PROCEDURE — 85027 COMPLETE CBC AUTOMATED: CPT | Performed by: INTERNAL MEDICINE

## 2024-01-10 PROCEDURE — 80048 BASIC METABOLIC PNL TOTAL CA: CPT | Performed by: INTERNAL MEDICINE

## 2024-01-10 RX ADMIN — PRASUGREL 10 MG: 5 TABLET, FILM COATED ORAL at 08:01:00

## 2024-01-10 RX ADMIN — ASPIRIN 81 MG: 81 TABLET ORAL at 08:01:00

## 2024-01-10 NOTE — PROGRESS NOTES
ICU nocturnal physician note\rapid response:    The patient is a 73-year-old male who underwent an elective coronary angiography today.  There was PCI of the LAD and plasty of the diagonal.  Plan was to discharge late this evening.  While laying in bed, he developed sudden faintness and his heart rate fell to 36 and systolic blood pressure fell to 60.  Rapid response was called.  We laid the patient flat with gentle Trendelenburg.  Fluid bolus of 500 cc was administered and the blood pressure recovered and the heart rate returned to 50.  Stat EKG showed a bigeminal rhythm and no ischemia.  The patient again felt well.    Physical Examination: Systolic blood pressure 117 with a pulse of 50 and regular. HEENT examination is unremarkable with pupils equal round and reactive to light and accommodation. Neck without adenopathy, thyromegaly, JVD nor bruit. Lungs clear to auscultation and percussion. Cardiac regular rate and rhythm no murmur. Abdomen nontender, without hepatosplenomegaly and no mass appreciable. Extremities and Musculoskeletal without clubbing cyanosis nor edema, and mobility acceptable. Neurologic grossly intact with symmetric tone and strength and reflex.  The groin site is unremarkable and the right wrist likewise.    Laboratory-EKG-no ischemic changes.  Bigeminal rhythm.  Rate 50.    Assessment and plan:  1.  Vasovagal response-recovered.  Discussed with CHANTEL Garcia for [facility].    Recommendations: Gentle fluid bolus, up only with assist, will hold patient overnight.    Jona Pritchett MD

## 2024-01-10 NOTE — PLAN OF CARE
Pt A/Ox4, stand-by assist. R-groin and R-radial cath sites dressings changed by RN. Pt originally planned for discharge after 2230 last night. RRT called at 2234, see note. New plan for overnight observation.     Problem: Patient Centered Care  Goal: Patient preferences are identified and integrated in the patient's plan of care  Description: Interventions:  - What would you like us to know as we care for you? I would like to return home.  - Provide timely, complete, and accurate information to patient/family  - Incorporate patient and family knowledge, values, beliefs, and cultural backgrounds into the planning and delivery of care  - Encourage patient/family to participate in care and decision-making at the level they choose  - Honor patient and family perspectives and choices  Outcome: Progressing     Problem: Patient/Family Goals  Goal: Patient/Family Long Term Goal  Description: Patient's Long Term Goal: Get stronger.     Interventions:  - Work with staff on ways to improve strength in a safe manner.   - See additional Care Plan goals for specific interventions  Outcome: Progressing  Goal: Patient/Family Short Term Goal  Description: Patient's Short Term Goal: Keep surgical sites clean.     Interventions:   - Work with RN, and ask question about surgical site care.   - See additional Care Plan goals for specific interventions  Outcome: Progressing

## 2024-01-10 NOTE — PROGRESS NOTES
Progress Note  Mau Cruz Patient Status:  Outpatient in a Bed    1950 MRN [id number]   Location [facility] 3W/SW Attending Mau Watson MD   Hosp Day # 0 PCP ZION SAAVEDRA MD     SUBJECTIVE:    Denies chest pain. Has been ambulating without complaints. Right wrist is sore but non-painful. No numbness or tingling to RUE/RLE.     VITALS:  /75 (BP Location: Right arm)   Pulse 65   Temp 98.2 °F (36.8 °C) (Oral)   Resp 19   Ht 5' 9\" (1.753 m)   Wt 163 lb (73.9 kg)   SpO2 98%   BMI 24.07 kg/m²     INTAKE/OUTPUT:    Intake/Output Summary (Last 24 hours) at 1/10/2024 1017  Last data filed at 1/10/2024 0850  Gross per 24 hour   Intake 320 ml   Output 850 ml   Net -530 ml     Last 3 Weights   24 1443 163 lb (73.9 kg)   24 1012 164 lb (74.4 kg)   23 1215 165 lb (74.8 kg)     LABS:  Recent Labs   Lab 24  1040 24  1333 01/10/24  0742   GLU  --  111* 132*   BUN  --  14 12   CREATSERUM  --  0.99 0.88   EGFRCR 91 81 92   CA  --  9.7 9.1   NA  --  135* 133*   K  --  3.8 3.8   CL  --  101 101   CO2  --  29.0 26.0     Recent Labs   Lab 24  1333 01/10/24  0742   RBC 4.71 4.43   HGB 14.7 13.7   HCT 41.5 38.8*   MCV 88.1 87.6   MCH 31.2 30.9   MCHC 35.4 35.3   RDW 11.9 12.2   NEPRELIM 4.89  --    WBC 7.4 8.1   .0 193.0       No results for input(s): \"TROP\", \"CK\" in the last 168 hours.    DIAGNOSTICS:    TELEMETRY: SB/SR    ROS: Negative unless noted above     PHYSICAL EXAM:  General: Alert and oriented x 3. No apparent distress.  HEENT: Normocephalic, sclera are nonicteric. Hearing appropriate bilaterally.  Neck: No JVD or Carotid bruits. Trachea midline.   Cardiac: Regular rate and rhythm. S1, S2 auscultated. No murmurs, rubs, or gallops appreciated.   Lungs: Clear without wheezes, rales, rhonchi or dullness. Chest expansion symmetrical. Regular effort.  Abdomen: Soft, non-tender, +BS. No hepatosplenomegaly or appreciable masses.   Extremities: Without  clubbing, cyanosis or edema.  Peripheral pulses are 2+.  Neurologic: Motor and sensory nerves grossly intact.   Psych: Appropriate affect   Skin: Warm and dry. Right wrist with scan bruising no hematoma or bruit. Right groin with dressing c/d/I. No hematoma or bruising.     MEDICATIONS:   aspirin  324 mg Oral Once    prasugrel  10 mg Oral Daily    aspirin  81 mg Oral Daily     ASSESSMENT:    RRT called last night, patient became hypotensive (SBP 60s) and bradycardic. He receive a 500 mL bolus and blood pressure recovered. Possibly vasovagal response.     CAD  - 1/9/24: PCI mid LAD w/ DESx1, angioplasty sidebranch jailed diagonal   - LVEF preserved on last assessment   - DAPT with ASA and Effient, High intensity statin therapy, No BB with underlying bradycardia. Blood pressures well controlled not on ARB/ACE.     Bradycardia  - Not on AV beatrice blocking agents, Resting HR is historically in the 40s. Asx     PLAN:  - Repeat ECHO in the outpatient setting to reassess LVEF   - Appears stable to discharge from a cardiology standpoint. He has a follow up appointment next Tuesday. Home on Crestor, Effient, Aspirin, Zetia.   - Activity instruction and site care reviewed     Plan of care discussed with patient and RN.     Azul Morton, APRN  1/10/2024  9:51 AM  [phone] ([place])  [phone] ([place])  '

## 2024-01-10 NOTE — PROGRESS NOTES
Spiritual Care Visit Note    Visited With: (P) Family    Spiritual Care Taxonomy:    Intended Effects: (P) Demonstrate caring and concern    Methods: (P) Offer support;Offer spiritual/Latter day support    Interventions: (P) Silent prayer;Active listening;Ask guided questions    Narrative   responded to rapid response code.  Patient was being cared for by care team.  Patient's wife was bedside.  Writer spoke with wife briefly, offered prayer, and provided her with a  care care.  Family aware that spiritual care is available upon request.     [facility] [phone]

## 2024-01-10 NOTE — DIETARY NOTE
NUTRITION EDUCATION NOTE     Received consult for cardiac nutrition education. Pt politely declined verbal discussion at this time. Provided with Eating Heart-Healthy and NCM handout to reinforce. Relayed informtion regarding available RD at cardiac rehab. May benefit from outpt f/u. Expect fair-good compliance.        Juliana Dennis RD, LDN  Clinical Dietitian  P: [phone]

## 2024-01-10 NOTE — CARDIAC REHAB
Cardiac Rehab Phase I    Activity:  Distance   Assistance needed   Patient tolerated activity .    Education:  Handouts provided and reviewed: Caring For Your Heart Booklet.       Diet: Healthy Cardiac diet reviewed.    Disease Process: Disease process reviewed.    Reviewed the following:       RISK FACTORS: Reviewed      SMOKING CESSATION: Reviewed      HOME EXERCISE ACTIVITY: Reviewed      OUTPATIENT CARDIAC REHAB: Referred to Cardiac Rehabilitation

## 2024-01-10 NOTE — CM/SW NOTE
01/10/24 1000   Discharge disposition   Expected discharge disposition Home or Self   Post Acute Care Provider Home   Home services after discharge None   Discharge transportation Private car     The patient received a MDO for discharge.     The patient will be transported home via private car.    The patient has no questions or concerns at this time.    SW/CM to remain available for support and/or discharge planning.     Hillary Castro MSW, LSW  Discharge Planner [phone]

## 2024-01-10 NOTE — PROGRESS NOTES
Progress Note  Mau Cruz Patient Status:  Outpatient in a Bed    1950 MRN [id number]   Location [facility] 3W/SW Attending Mau Watson MD   Hosp Day # 0 PCP ZION SAAVEDRA MD     SUBJECTIVE:    Denies chest pain. Has been ambulating without complaints. Right wrist is sore but non-painful. No numbness or tingling to RUE/RLE.     VITALS:  /75 (BP Location: Right arm)   Pulse 65   Temp 98.2 °F (36.8 °C) (Oral)   Resp 19   Ht 5' 9\" (1.753 m)   Wt 163 lb (73.9 kg)   SpO2 98%   BMI 24.07 kg/m²     INTAKE/OUTPUT:    Intake/Output Summary (Last 24 hours) at 1/10/2024 0951  Last data filed at 1/10/2024 0850  Gross per 24 hour   Intake 320 ml   Output 850 ml   Net -530 ml     Last 3 Weights   24 1443 163 lb (73.9 kg)   24 1012 164 lb (74.4 kg)   23 1215 165 lb (74.8 kg)     LABS:  Recent Labs   Lab 24  1040 24  1333 01/10/24  0742   GLU  --  111* 132*   BUN  --  14 12   CREATSERUM  --  0.99 0.88   EGFRCR 91 81 92   CA  --  9.7 9.1   NA  --  135* 133*   K  --  3.8 3.8   CL  --  101 101   CO2  --  29.0 26.0     Recent Labs   Lab 24  1333 01/10/24  0742   RBC 4.71 4.43   HGB 14.7 13.7   HCT 41.5 38.8*   MCV 88.1 87.6   MCH 31.2 30.9   MCHC 35.4 35.3   RDW 11.9 12.2   NEPRELIM 4.89  --    WBC 7.4 8.1   .0 193.0       No results for input(s): \"TROP\", \"CK\" in the last 168 hours.    DIAGNOSTICS:    TELEMETRY: SB/SR    ROS: Negative unless noted above     PHYSICAL EXAM:  General: Alert and oriented x 3. No apparent distress.  HEENT: Normocephalic, sclera are nonicteric. Hearing appropriate bilaterally.  Neck: No JVD or Carotid bruits. Trachea midline.   Cardiac: Regular rate and rhythm. S1, S2 auscultated. No murmurs, rubs, or gallops appreciated.   Lungs: Clear without wheezes, rales, rhonchi or dullness. Chest expansion symmetrical. Regular effort.  Abdomen: Soft, non-tender, +BS. No hepatosplenomegaly or appreciable masses.   Extremities: Without  clubbing, cyanosis or edema.  Peripheral pulses are 2+.  Neurologic: Motor and sensory nerves grossly intact.   Psych: Appropriate affect   Skin: Warm and dry. Right wrist with scan bruising no hematoma or bruit. Right groin with dressing c/d/I. No hematoma or bruising.     MEDICATIONS:   aspirin  324 mg Oral Once    prasugrel  10 mg Oral Daily    aspirin  81 mg Oral Daily     ASSESSMENT:    RRT called last night, patient became hypotensive (SBP 60s) and bradycardic. He receive a 500 mL bolus and blood pressure recovered.     CAD  - 1/9/24: PCI mid LAD w/ DESx1, angioplasty sidebranch jailed diagonal   - LVEF preserved on last assessment   - DAPT with ASA and Effient, High intensity statin therapy, No BB with underlying bradycardia. Blood pressures well controlled not on ARB/ACE.     Bradycardia  - Not on AV beatrice blocking agents, Resting HR is historically in the 40s. Asx     PLAN:  - Repeat ECHO in the outpatient setting to reassess LVEF   - Appears stable to discharge from a cardiology standpoint. He has a follow up appointment next Tuesday. Home on Crestor, Effient, Aspirin, Zetia.   - Activity instruction and site care reviewed     Plan of care discussed with patient and RN.     Azul Morton, APRN  1/10/2024  9:51 AM  [phone] ([place])  [phone] ([place])  '

## 2024-01-10 NOTE — PLAN OF CARE
Patient alert and oriented. Ambulating independently in room and stewart. Cleared for discharge home. IV removed and tele discontinued prior to discharge. Discharge instructions reviewed with patient, questions answered, verbalized understanding. Wife at the bedside. Patient reports wife will transport him home.     Problem: Patient Centered Care  Goal: Patient preferences are identified and integrated in the patient's plan of care  Description: Interventions:  - What would you like us to know as we care for you? I live at home with my wife.   - Provide timely, complete, and accurate information to patient/family  - Incorporate patient and family knowledge, values, beliefs, and cultural backgrounds into the planning and delivery of care  - Encourage patient/family to participate in care and decision-making at the level they choose  - Honor patient and family perspectives and choices  Outcome: Completed     Problem: Patient/Family Goals  Goal: Patient/Family Long Term Goal  Description: Patient's Long Term Goal: Return home    Interventions:  - Cath   - ambulate  - monitor labs and vitals  - See additional Care Plan goals for specific interventions  Outcome: Completed  Goal: Patient/Family Short Term Goal  Description: Patient's Short Term Goal: no bleeding at cath site    Interventions:   - monitor labs and vitals  - pressure dressing  - tr band and arm board  - cardiology consult  - See additional Care Plan goals for specific interventions  Outcome: Completed      Oculoplastic Surgeon Procedure Text (A): After obtaining clear surgical margins the patient was sent to oculoplastics for surgical repair.  The patient understands they will receive post-surgical care and follow-up from the referring physician's office.

## 2024-01-10 NOTE — SIGNIFICANT EVENT
RRT    *See RRT Documentation Record*    Reason the RRT was called: HR 36, BP 67/47, Patient pale and stating \"I feel sick and like I am going to pass out.\"  Assessment of patient leading up to RRT: @2200 Pt was assisted to bathroom by RN. No signs of pale skin or feelings of being sick.   Interventions/Testing: EKG and 500ml Bolus.  Patient Outcome/Disposition: Pt will stay overnight for observation.   Family Notified: yes  Name of family notified: Stefanie Cruz, at bedside.

## 2024-01-10 NOTE — PLAN OF CARE
Mau Cruz Patient Status:  Outpatient in a Bed    1950 MRN [id number]   Location [facility] 3W/SW Attending Mau Watson MD   Hosp Day # 0 PCP ZION SAAVEDRA MD     Cardiology Nocturnal APN Plan of Care     Page Received: 6400 Dr. Pritchett          Assessment/Plan:     RRT called for dizziness, SBP very low. Recovered quickly, BP now returned to normal.    Likely vasovagal     We will plan to keep him overnight for monitoring.      AWA Eller  [facility] [place]  2024  10:51 PM

## 2024-01-10 NOTE — DISCHARGE SUMMARY
[facility]     Discharge Summary    Mau Cruz Patient Status:  Outpatient in a Bed    1950 MRN [id number]   Location [facility] 3W/SW Attending Mau Watson MD   Hosp Day # 0 PCP ZION SAAVEDRA MD         Admit date: 2024    Discharge date and time: No discharge date for patient encounter.     Admitting Physician: Irineo Schwartz MD     Admission Diagnoses: Abnormal nuclear stress test [R94.39]  Abnormal cardiac CT angiography [R93.1]    Discharge Diagnoses: Active Problems:    * No active hospital problems. *      HPI: ***    Consultations: ***    Procedures: ***    Hospital Course: ***    Follow-up with *** as directed.    Discharge Medications:   Current Discharge Medication List        START taking these medications    Details   prasugrel 10 MG Oral Tab Take 1 tablet (10 mg total) by mouth daily.  Qty: 30 tablet, Refills: 11           CONTINUE these medications which have NOT CHANGED    Details   Probiotic Product (ALIGN) 4 MG Oral Cap Take 1 tablet by mouth daily.      ezetimibe 10 MG Oral Tab Take 1 tablet (10 mg total) by mouth daily.  Qty: 90 tablet, Refills: 3      rosuvastatin 20 MG Oral Tab Take 1 tablet (20 mg total) by mouth nightly.  Qty: 90 tablet, Refills: 3      gabapentin 100 MG Oral Cap Take 1 capsule (100 mg total) by mouth 2 (two) times daily.      Cholecalciferol (VITAMIN D) 2000 units Oral Cap Take by mouth daily.      aspirin 81 MG Oral Tab Take 1 tablet (81 mg total) by mouth daily.                Medication List        START taking these medications      prasugrel 10 MG Tabs  Commonly known as: Effient  Take 1 tablet (10 mg total) by mouth daily.            CONTINUE taking these medications      Align 4 MG Caps  Notes to patient: Take 1 tablet by mouth daily.     aspirin 81 MG Tabs  Notes to patient: Take 1 tablet (81 mg total) by mouth daily.     ezetimibe 10 MG Tabs  Commonly known as: Zetia  Take 1 tablet (10 mg total) by mouth daily.      gabapentin 100 MG Caps  Commonly known as: Neurontin  Notes to patient: Take 1 capsule (100 mg total) by mouth 2 (two) times daily.     rosuvastatin 20 MG Tabs  Commonly known as: Crestor  Take 1 tablet (20 mg total) by mouth nightly.     Vitamin D 50 MCG (2000 UT) Caps  Notes to patient: Take by mouth daily.               Where to Get Your Medications        These medications were sent to [id number] - [place], [place] - [address] [phone], [phone]  [address] [place] [address]      Phone: [phone]   prasugrel 10 MG Tabs         Signed:  Azul Morton, APRN  1/10/2024

## 2024-04-10 ENCOUNTER — LAB ENCOUNTER (OUTPATIENT)
Dept: LAB | Facility: HOSPITAL | Age: 74
End: 2024-04-10
Attending: INTERNAL MEDICINE
Payer: MEDICARE

## 2024-04-10 DIAGNOSIS — R53.83 OTHER FATIGUE: Primary | ICD-10-CM

## 2024-04-10 DIAGNOSIS — R79.89 ELEVATED LIVER FUNCTION TESTS: Primary | ICD-10-CM

## 2024-04-10 LAB
ALBUMIN SERPL-MCNC: 4.8 G/DL (ref 3.2–4.8)
ALBUMIN/GLOB SERPL: 1.7 {RATIO} (ref 1–2)
ALP LIVER SERPL-CCNC: 94 U/L
ALT SERPL-CCNC: 66 U/L
ANION GAP SERPL CALC-SCNC: 3 MMOL/L (ref 0–18)
AST SERPL-CCNC: 48 U/L (ref ?–34)
BILIRUB SERPL-MCNC: 0.6 MG/DL (ref 0.2–1.1)
BUN BLD-MCNC: 13 MG/DL (ref 9–23)
BUN/CREAT SERPL: 13.1 (ref 10–20)
CALCIUM BLD-MCNC: 10.2 MG/DL (ref 8.7–10.4)
CHLORIDE SERPL-SCNC: 107 MMOL/L (ref 98–112)
CO2 SERPL-SCNC: 28 MMOL/L (ref 21–32)
CREAT BLD-MCNC: 0.99 MG/DL
DEPRECATED RDW RBC AUTO: 38.3 FL (ref 35.1–46.3)
EGFRCR SERPLBLD CKD-EPI 2021: 81 ML/MIN/1.73M2 (ref 60–?)
ERYTHROCYTE [DISTWIDTH] IN BLOOD BY AUTOMATED COUNT: 12 % (ref 11–15)
FASTING STATUS PATIENT QL REPORTED: YES
GLOBULIN PLAS-MCNC: 2.8 G/DL (ref 2.8–4.4)
GLUCOSE BLD-MCNC: 93 MG/DL (ref 70–99)
HCT VFR BLD AUTO: 44.4 %
HGB BLD-MCNC: 15.6 G/DL
MCH RBC QN AUTO: 30.9 PG (ref 26–34)
MCHC RBC AUTO-ENTMCNC: 35.1 G/DL (ref 31–37)
MCV RBC AUTO: 87.9 FL
OSMOLALITY SERPL CALC.SUM OF ELEC: 286 MOSM/KG (ref 275–295)
PLATELET # BLD AUTO: 258 10(3)UL (ref 150–450)
POTASSIUM SERPL-SCNC: 4.4 MMOL/L (ref 3.5–5.1)
PROT SERPL-MCNC: 7.6 G/DL (ref 5.7–8.2)
RBC # BLD AUTO: 5.05 X10(6)UL
SODIUM SERPL-SCNC: 138 MMOL/L (ref 136–145)
TSI SER-ACNC: 2.51 MIU/ML (ref 0.55–4.78)
WBC # BLD AUTO: 8.7 X10(3) UL (ref 4–11)

## 2024-04-10 PROCEDURE — 36415 COLL VENOUS BLD VENIPUNCTURE: CPT

## 2024-04-10 PROCEDURE — 85027 COMPLETE CBC AUTOMATED: CPT

## 2024-04-10 PROCEDURE — 80053 COMPREHEN METABOLIC PANEL: CPT

## 2024-04-10 PROCEDURE — 84443 ASSAY THYROID STIM HORMONE: CPT

## 2024-04-16 ENCOUNTER — TELEPHONE (OUTPATIENT)
Dept: ORTHOPEDICS CLINIC | Facility: CLINIC | Age: 74
End: 2024-04-16

## 2024-04-16 NOTE — TELEPHONE ENCOUNTER
Please call patient to schedule a steroid injection with Dr Blankenship (his personal acquaintance).  Thank you!

## 2024-04-16 NOTE — TELEPHONE ENCOUNTER
Pt states that he have questions before scheduling an appointment regarding knee inj's and knee replacemet for his left knee.

## 2024-04-22 ENCOUNTER — LAB ENCOUNTER (OUTPATIENT)
Dept: LAB | Facility: HOSPITAL | Age: 74
End: 2024-04-22
Attending: INTERNAL MEDICINE
Payer: MEDICARE

## 2024-04-22 DIAGNOSIS — R79.89 ELEVATED LIVER FUNCTION TESTS: ICD-10-CM

## 2024-04-22 LAB
ALBUMIN SERPL-MCNC: 4.5 G/DL (ref 3.2–4.8)
ALP LIVER SERPL-CCNC: 79 U/L
ALT SERPL-CCNC: 46 U/L
AST SERPL-CCNC: 38 U/L (ref ?–34)
BILIRUB DIRECT SERPL-MCNC: 0.3 MG/DL (ref ?–0.3)
BILIRUB SERPL-MCNC: 0.8 MG/DL (ref 0.2–1.1)
HAV AB SER QL IA: REACTIVE
HAV IGM SER QL: NONREACTIVE
HBV CORE AB SERPL QL IA: NONREACTIVE
HBV SURFACE AB SER QL: NONREACTIVE
HBV SURFACE AB SERPL IA-ACNC: <3.1 MIU/ML
HBV SURFACE AG SERPL QL IA: NONREACTIVE
HCV AB SERPL QL IA: NONREACTIVE
PROT SERPL-MCNC: 6.9 G/DL (ref 5.7–8.2)

## 2024-04-22 PROCEDURE — 86709 HEPATITIS A IGM ANTIBODY: CPT

## 2024-04-22 PROCEDURE — 86704 HEP B CORE ANTIBODY TOTAL: CPT

## 2024-04-22 PROCEDURE — 86708 HEPATITIS A ANTIBODY: CPT

## 2024-04-22 PROCEDURE — 86706 HEP B SURFACE ANTIBODY: CPT

## 2024-04-22 PROCEDURE — 86803 HEPATITIS C AB TEST: CPT

## 2024-04-22 PROCEDURE — 87340 HEPATITIS B SURFACE AG IA: CPT

## 2024-04-22 PROCEDURE — 36415 COLL VENOUS BLD VENIPUNCTURE: CPT

## 2024-04-22 PROCEDURE — 80076 HEPATIC FUNCTION PANEL: CPT

## 2024-04-22 PROCEDURE — 80503 PATH CLIN CONSLTJ SF 5-20: CPT

## 2024-04-22 NOTE — TELEPHONE ENCOUNTER
Please call patient and offer an appointment with  on 5/1/24 at 4:20 pm with  this will be for left knee surgical discussion and injection.

## 2024-05-01 ENCOUNTER — OFFICE VISIT (OUTPATIENT)
Dept: ORTHOPEDICS CLINIC | Facility: CLINIC | Age: 74
End: 2024-05-01
Payer: MEDICARE

## 2024-05-01 VITALS — BODY MASS INDEX: 24.82 KG/M2 | WEIGHT: 165.63 LBS | HEIGHT: 68.5 IN

## 2024-05-01 DIAGNOSIS — M17.12 PRIMARY OSTEOARTHRITIS OF LEFT KNEE: Primary | ICD-10-CM

## 2024-05-01 PROCEDURE — 99214 OFFICE O/P EST MOD 30 MIN: CPT | Performed by: ORTHOPAEDIC SURGERY

## 2024-05-01 NOTE — PROGRESS NOTES
[facility] Progress Note    Subjective: Mr. Cruz returns for discussion of his left knee.  He states he is more limited in activities due to the pain.  It does not bother him at rest or while sleeping, but does bother him with activity such as walking and exercise.  He is looking to get back to low impact exercises such as biking, swimming, walking.  Over the past few days symptoms have actually been better.  He is going on a trip to [place] in a few weeks and will be walking quite a bit.    Objective: Left knee without effusion.  Extension within about 5 degrees of full, flexion around 1 20-1 30.      Assessment/Plan: 74-year-old male with symptomatic radiographically severe left knee osteoarthritis.  Revisited discussion of treatment options including nonsurgical and surgical. He has reasonably attempted nonsurgical treatments as mentioned above and remains limited in activities of daily living secondary to pain from knee arthritis.  Risks and benefits reviewed, risks including but not limited to infection, blood clots, fracture, loosening or failure of components, stiffness, continued pain, need for further intervention or revision surgery.  Additionally I discussed expectations with regards to the postoperative recovery timeframe, the possibility of mechanical clicking with the knee, numbness on the lateral side of the knee/leg from sacrifice of the infrapatellar branch of the saphenous nerve, and potential for difficulty/pain with kneeling and performing deep flexion activities.  He understands this discussion and would like to proceed with knee replacement surgery.  He would like to look at this fall, around September or October, for surgery date.  He would like to hold off on injection today, but may consider setting one up next week prior to his trip to [place].  Binder was provided today.  He would need primary care evaluation and clearance, as well as cardiology evaluation and clearance given  prasugrel usage to ensure it is an appropriate time for him to come off medication (likely 1 week prior to surgery, 1 week after surgery, with plan for Lovenox for the first week postoperatively).    Shahid Blankenship MD, [facility]  [facility] Orthopedic Surgery  Phone [phone]  Fax [phone]

## 2024-05-01 NOTE — PROGRESS NOTES
SURGERY SCHEDULING SHEET    Mau Cruz  4/6/1950  [id number]    Procedure: Left total knee arthroplasty    CPT: 00165    Diagnosis: Left knee osteoarthritis    Anesthesia: Choice    Length of Surgery: 2 hours    Disposition: Inpatient    Instruments: Medacta TKA    Assist: If case scheduled on Thurs/Fri, then Pwael Carr first assist. If case scheduled on Tues, then Ronny Mendyelsycisco ([phone]) first assist. If Ronny unavailable, then please communicate to Pawel Carr/Ping/Angela to cancel Pawel's clinic for that day and have Pawel first assist. If Pawel unavailable, contact Tee Ha for first assist. If Ronny and Tee unavailable, then contact [facility] for first assist.    Pre-op Testing: CBC, CMP, PT/INR, PTT, TYPE AND SCREEN, and MRSA/MSSA THROUGH EDW PAT    Clearance: MEDICAL/PCP and CARDIAC    Post op: 2 weeks postop appointment with Pawel Carr    Surgery date specifics: Around September/October 2024    Shahid Blankenship MD, FAAOS  [facility] Orthopedic Surgery  Phone: [phone]  Fax: [phone]

## 2024-05-02 ENCOUNTER — TELEPHONE (OUTPATIENT)
Dept: ORTHOPEDICS CLINIC | Facility: CLINIC | Age: 74
End: 2024-05-02

## 2024-05-02 DIAGNOSIS — M17.12 PRIMARY OSTEOARTHRITIS OF LEFT KNEE: Primary | ICD-10-CM

## 2024-05-02 NOTE — TELEPHONE ENCOUNTER
Spoke with patient and he needs to talk things over with his wife. Patient will call back when ready to schedule

## 2024-05-02 NOTE — TELEPHONE ENCOUNTER
Date of Surgery:    10/17/2024    Post Op Appt:  10/30 820AM    Case ID: [id number]     Notes: Family friend, he would like to proceed with knee replacement in September or October of this year.  Thank you     SURY VALLEJO CARDIAC DR          SURGERY SCHEDULING SHEET     Sury Cruz  4/6/1950  [id number]     Procedure: Left total knee arthroplasty     CPT: 32210     Diagnosis: Left knee osteoarthritis     Anesthesia: Choice     Length of Surgery: 2 hours     Disposition: Inpatient     Instruments: Medacta TKA     Assist: If case scheduled on Thurs/Fri, then Pawel Carr first assist. If case scheduled on Tues, then Ronny Brooketov ([phone]) first assist. If Ronny unavailable, then please communicate to Pawel Carr/Ping/Angela to cancel Pawel's clinic for that day and have Pawel first assist. If Pawel unavailable, contact Tee Ha for first assist. If Ronny and Tee unavailable, then contact [facility] for first assist.     Pre-op Testing: CBC, CMP, PT/INR, PTT, TYPE AND SCREEN, and MRSA/MSSA THROUGH EDW PAT     Clearance: MEDICAL/PCP and CARDIAC     Post op: 2 weeks postop appointment with Pawel Carr     Surgery date specifics: Around September/October 2024     Shahid Blankenship MD, FAAOS  [facility] Orthopedic Surgery  Phone: [phone]  Fax: [phone]

## 2024-05-06 NOTE — TELEPHONE ENCOUNTER
Spoke with patient and we scheduled surgery and post op.     Patient will call back to go over pre operative procedures    PCP & Cardiac Clearance Sent

## 2024-05-08 ENCOUNTER — OFFICE VISIT (OUTPATIENT)
Dept: ORTHOPEDICS CLINIC | Facility: CLINIC | Age: 74
End: 2024-05-08
Payer: MEDICARE

## 2024-05-08 VITALS — HEIGHT: 68.5 IN | WEIGHT: 165.63 LBS | BODY MASS INDEX: 24.82 KG/M2

## 2024-05-08 DIAGNOSIS — M17.12 PRIMARY OSTEOARTHRITIS OF LEFT KNEE: Primary | ICD-10-CM

## 2024-05-08 PROCEDURE — 20610 DRAIN/INJ JOINT/BURSA W/O US: CPT | Performed by: ORTHOPAEDIC SURGERY

## 2024-05-08 RX ORDER — TRIAMCINOLONE ACETONIDE 40 MG/ML
40 INJECTION, SUSPENSION INTRA-ARTICULAR; INTRAMUSCULAR ONCE
Status: COMPLETED | OUTPATIENT
Start: 2024-05-08 | End: 2024-05-08

## 2024-05-08 RX ADMIN — TRIAMCINOLONE ACETONIDE 40 MG: 40 INJECTION, SUSPENSION INTRA-ARTICULAR; INTRAMUSCULAR at 08:09:00

## 2024-09-03 ENCOUNTER — TELEPHONE (OUTPATIENT)
Dept: ORTHOPEDICS CLINIC | Facility: CLINIC | Age: 74
End: 2024-09-03

## 2024-09-03 NOTE — TELEPHONE ENCOUNTER
Patient would like to speak with a nurse in regards to wanting to know if he should reschedule surgery or come in for an injection. He states no change in pain, no accident or injury. Offered to assist him but is addiment in speaking with a nurse regarding this matter. Advised him that the nurses will not be able to assist him in this matter if pertaining to surgery. He also states that this is now his 3 call to request to speak with a nurse.

## 2024-09-03 NOTE — TELEPHONE ENCOUNTER
Scheduled for a Knee replacement~October 17    Spoke with Krishna. Hasn't had pain since his cortisone injection that he had in May~     His question is \"doesn't it make more sense for me to cancel the upcoming surgery and get another injection of cortisone if my pain returns?\"    Advised him that RN cannot help him with that decision.     He will need to weigh the odds. Advised him that the osteoarthritis never gets better. He may feel better since the injection, but the disease in the knee is still the same. He understands this.     Advised him that there is a chance that the pain returns tomorrow and that the second cortisone shot doesn't work and he is now booked out into February or March for the surgery. That is his dilemma.     He states that he lives a very active lifestyle and he has asked that we ask  for his thoughts on the matter.

## 2024-09-12 ENCOUNTER — TELEPHONE (OUTPATIENT)
Dept: ORTHOPEDICS CLINIC | Facility: CLINIC | Age: 74
End: 2024-09-12

## 2024-09-12 DIAGNOSIS — M17.12 PRIMARY OSTEOARTHRITIS OF LEFT KNEE: Primary | ICD-10-CM

## 2024-11-26 ENCOUNTER — LAB ENCOUNTER (OUTPATIENT)
Dept: LAB | Facility: HOSPITAL | Age: 74
End: 2024-11-26
Attending: INTERNAL MEDICINE
Payer: MEDICARE

## 2024-11-26 DIAGNOSIS — R97.20 ELEVATED PSA: ICD-10-CM

## 2024-11-26 DIAGNOSIS — R97.0 ELEVATED CARCINOEMBRYONIC ANTIGEN (CEA): ICD-10-CM

## 2024-11-26 DIAGNOSIS — E78.00 HYPERCHOLESTEREMIA: ICD-10-CM

## 2024-11-26 DIAGNOSIS — I25.10 CAD (CORONARY ARTERY DISEASE), NATIVE CORONARY ARTERY: Primary | ICD-10-CM

## 2024-11-26 DIAGNOSIS — E78.00 PURE HYPERCHOLESTEROLEMIA: ICD-10-CM

## 2024-11-26 LAB
ALBUMIN SERPL-MCNC: 4.8 G/DL (ref 3.2–4.8)
ALBUMIN/GLOB SERPL: 2.1 {RATIO} (ref 1–2)
ALP LIVER SERPL-CCNC: 79 U/L
ALT SERPL-CCNC: 39 U/L
ANION GAP SERPL CALC-SCNC: 7 MMOL/L (ref 0–18)
AST SERPL-CCNC: 32 U/L (ref ?–34)
BASOPHILS # BLD AUTO: 0.04 X10(3) UL (ref 0–0.2)
BASOPHILS NFR BLD AUTO: 0.5 %
BILIRUB SERPL-MCNC: 0.9 MG/DL (ref 0.2–1.1)
BUN BLD-MCNC: 13 MG/DL (ref 9–23)
BUN/CREAT SERPL: 12.4 (ref 10–20)
CALCIUM BLD-MCNC: 10 MG/DL (ref 8.7–10.4)
CHLORIDE SERPL-SCNC: 103 MMOL/L (ref 98–112)
CHOLEST SERPL-MCNC: 132 MG/DL (ref ?–200)
CK SERPL-CCNC: 187 U/L
CO2 SERPL-SCNC: 28 MMOL/L (ref 21–32)
CREAT BLD-MCNC: 1.05 MG/DL
DEPRECATED RDW RBC AUTO: 39.7 FL (ref 35.1–46.3)
EGFRCR SERPLBLD CKD-EPI 2021: 75 ML/MIN/1.73M2 (ref 60–?)
EOSINOPHIL # BLD AUTO: 0.16 X10(3) UL (ref 0–0.7)
EOSINOPHIL NFR BLD AUTO: 1.9 %
ERYTHROCYTE [DISTWIDTH] IN BLOOD BY AUTOMATED COUNT: 12.2 % (ref 11–15)
FASTING PATIENT LIPID ANSWER: YES
FASTING STATUS PATIENT QL REPORTED: YES
GLOBULIN PLAS-MCNC: 2.3 G/DL (ref 2–3.5)
GLUCOSE BLD-MCNC: 89 MG/DL (ref 70–99)
HCT VFR BLD AUTO: 41.8 %
HDLC SERPL-MCNC: 52 MG/DL (ref 40–59)
HGB BLD-MCNC: 14.9 G/DL
IMM GRANULOCYTES # BLD AUTO: 0.03 X10(3) UL (ref 0–1)
IMM GRANULOCYTES NFR BLD: 0.4 %
LDLC SERPL CALC-MCNC: 63 MG/DL (ref ?–100)
LYMPHOCYTES # BLD AUTO: 1.38 X10(3) UL (ref 1–4)
LYMPHOCYTES NFR BLD AUTO: 16.3 %
MCH RBC QN AUTO: 31.9 PG (ref 26–34)
MCHC RBC AUTO-ENTMCNC: 35.6 G/DL (ref 31–37)
MCV RBC AUTO: 89.5 FL
MONOCYTES # BLD AUTO: 0.87 X10(3) UL (ref 0.1–1)
MONOCYTES NFR BLD AUTO: 10.3 %
NEUTROPHILS # BLD AUTO: 5.98 X10 (3) UL (ref 1.5–7.7)
NEUTROPHILS # BLD AUTO: 5.98 X10(3) UL (ref 1.5–7.7)
NEUTROPHILS NFR BLD AUTO: 70.6 %
NONHDLC SERPL-MCNC: 80 MG/DL (ref ?–130)
OSMOLALITY SERPL CALC.SUM OF ELEC: 286 MOSM/KG (ref 275–295)
PLATELET # BLD AUTO: 215 10(3)UL (ref 150–450)
POTASSIUM SERPL-SCNC: 4.4 MMOL/L (ref 3.5–5.1)
PROT SERPL-MCNC: 7.1 G/DL (ref 5.7–8.2)
PSA SERPL-MCNC: <0.04 NG/ML (ref ?–4)
RBC # BLD AUTO: 4.67 X10(6)UL
SODIUM SERPL-SCNC: 138 MMOL/L (ref 136–145)
TRIGL SERPL-MCNC: 90 MG/DL (ref 30–149)
TSI SER-ACNC: 1.84 UIU/ML (ref 0.55–4.78)
VLDLC SERPL CALC-MCNC: 13 MG/DL (ref 0–30)
WBC # BLD AUTO: 8.5 X10(3) UL (ref 4–11)

## 2024-11-26 PROCEDURE — 84443 ASSAY THYROID STIM HORMONE: CPT

## 2024-11-26 PROCEDURE — 84153 ASSAY OF PSA TOTAL: CPT

## 2024-11-26 PROCEDURE — 80061 LIPID PANEL: CPT

## 2024-11-26 PROCEDURE — 82550 ASSAY OF CK (CPK): CPT

## 2024-11-26 PROCEDURE — 36415 COLL VENOUS BLD VENIPUNCTURE: CPT

## 2024-11-26 PROCEDURE — 85025 COMPLETE CBC W/AUTO DIFF WBC: CPT

## 2024-11-26 PROCEDURE — 80053 COMPREHEN METABOLIC PANEL: CPT

## 2024-12-17 ENCOUNTER — HOSPITAL ENCOUNTER (OUTPATIENT)
Dept: CV DIAGNOSTICS | Facility: HOSPITAL | Age: 74
Discharge: HOME | End: 2024-12-17
Attending: INTERNAL MEDICINE
Payer: MEDICARE

## 2024-12-17 DIAGNOSIS — I25.10 CAD (CORONARY ARTERY DISEASE): ICD-10-CM

## 2024-12-17 LAB
% OF MAX PREDICTED HR: 100 %
MAX DIASTOLIC BP: 55 MMHG
MAX HEART RATE: 173 BPM
MAX PREDICTED HEART RATE: 148 BPM
MAX SYSTOLIC BP: 160 MMHG
MAX WORK LOAD: 134

## 2024-12-17 PROCEDURE — 93016 CV STRESS TEST SUPVJ ONLY: CPT | Performed by: STUDENT IN AN ORGANIZED HEALTH CARE EDUCATION/TRAINING PROGRAM

## 2024-12-17 PROCEDURE — 93350 STRESS TTE ONLY: CPT | Performed by: INTERNAL MEDICINE

## 2024-12-17 PROCEDURE — 93017 CV STRESS TEST TRACING ONLY: CPT | Performed by: INTERNAL MEDICINE

## 2024-12-17 PROCEDURE — 93018 CV STRESS TEST I&R ONLY: CPT | Performed by: STUDENT IN AN ORGANIZED HEALTH CARE EDUCATION/TRAINING PROGRAM

## 2024-12-17 RX ORDER — TAMSULOSIN HYDROCHLORIDE 0.4 MG/1
CAPSULE ORAL
COMMUNITY

## 2025-01-08 ENCOUNTER — OFFICE VISIT (OUTPATIENT)
Dept: ORTHOPEDICS CLINIC | Facility: CLINIC | Age: 75
End: 2025-01-08
Payer: MEDICARE

## 2025-01-08 DIAGNOSIS — M17.12 PRIMARY OSTEOARTHRITIS OF LEFT KNEE: Primary | ICD-10-CM

## 2025-01-08 PROCEDURE — 20610 DRAIN/INJ JOINT/BURSA W/O US: CPT | Performed by: ORTHOPAEDIC SURGERY

## 2025-01-08 RX ORDER — TRIAMCINOLONE ACETONIDE 40 MG/ML
40 INJECTION, SUSPENSION INTRA-ARTICULAR; INTRAMUSCULAR ONCE
Status: COMPLETED | OUTPATIENT
Start: 2025-01-08 | End: 2025-01-08

## 2025-01-08 RX ADMIN — TRIAMCINOLONE ACETONIDE 40 MG: 40 INJECTION, SUSPENSION INTRA-ARTICULAR; INTRAMUSCULAR at 10:34:00

## 2025-01-08 NOTE — PROCEDURES
Risks and benefits of knee injection discussed with the patient, with risks including but not limited to pain and swelling at the injection site and/or within the knee joint, infection, elevation in blood pressure and/or glucose levels, facial flushing.  After informed consent, the patient's left knee was marked, locally anesthetized with skin refrigerant, prepped with topical antiseptic, and injected with a mixture of 1mL 40mg/mL Kenalog, 2mL 1% lidocaine and 2mL 0.5% marcaine through the inferolateral portal.  A band-aid was applied.  The patient tolerated the procedure well.    He would like to look to reschedule surgery.  Considering April.  Revisited discussion of risks and benefits, recovery and expectations.  Will look to schedule for April.    Shahid Blankenship MD, [facility]  [facility] Orthopedic Surgery  Phone [phone]  Fax [phone]

## 2025-01-14 ENCOUNTER — TELEPHONE (OUTPATIENT)
Dept: ORTHOPEDICS CLINIC | Facility: CLINIC | Age: 75
End: 2025-01-14

## 2025-01-14 ENCOUNTER — DOCUMENTATION ONLY (OUTPATIENT)
Facility: CLINIC | Age: 75
End: 2025-01-14

## 2025-01-14 DIAGNOSIS — M17.12 PRIMARY OSTEOARTHRITIS OF LEFT KNEE: Primary | ICD-10-CM

## 2025-01-14 NOTE — TELEPHONE ENCOUNTER
Date of Surgery:    4/11/2025 (FRIDAY)    Post Op Appt:  4/30/2025 11    Case ID: [id number]     Notes:             SURGERY SCHEDULING SHEET     Mau Cruz  4/6/1950  [id number]     Procedure: Left total knee arthroplasty     CPT: 52926     Diagnosis: Left knee osteoarthritis     Anesthesia: Choice     Length of Surgery: 2 hours     Disposition: Inpatient     Instruments: Medacta TKA     Assist: If case scheduled on Thurs/Fri, then Pawel Carr first assist. If case scheduled on Tues, then Ronny Nguyen ([phone]) first assist. If Ronny unavailable, then please communicate to Pawel Carr/Ping/Angela to cancel Pawel's clinic for that day and have Pawel first assist. If Pawel unavailable, contact Tee Ha for first assist. If Ronny and Tee unavailable, then contact [facility] for first assist.     Pre-op Testing: CBC, CMP, PT/INR, PTT, TYPE AND SCREEN, and MRSA/MSSA THROUGH EDW PAT     Clearance: MEDICAL/PCP and CARDIAC     Post op: 2 weeks postop appointment with Pawel Carr     Surgery date specifics: Looking for April 2025 (if we don't have any openings due to cancellations/reschedules, can also look to offer Friday April 11 or Friday April 25) Thank you     Shahid Blankenship MD, [facility]  [facility] Orthopedic Surgery  Phone: [phone]  Fax: [phone]

## 2025-01-14 NOTE — TELEPHONE ENCOUNTER
Spoke with patient and we scheduled surgery, post op and went over pre operative procedures. All questions answered.    PCP CLEARANCE SENT  CARDIAC CLEARANCE SENT

## 2025-01-14 NOTE — PROGRESS NOTES
SURGERY SCHEDULING SHEET     Mau Cruz  4/6/1950  [id number]     Procedure: Left total knee arthroplasty     CPT: 81466     Diagnosis: Left knee osteoarthritis     Anesthesia: Choice     Length of Surgery: 2 hours     Disposition: Inpatient     Instruments: Medacta TKA     Assist: If case scheduled on Thurs/Fri, then Pawel Carr first assist. If case scheduled on Tues, then Ronny Patrick ([phone]) first assist. If Ronny unavailable, then please communicate to Pawel Carr/Ping/Angela to cancel Pawel's clinic for that day and have Pawel first assist. If Pawel unavailable, contact Tee Ha for first assist. If oRnny and Tee unavailable, then contact [facility] for first assist.     Pre-op Testing: CBC, CMP, PT/INR, PTT, TYPE AND SCREEN, and MRSA/MSSA THROUGH EDW PAT     Clearance: MEDICAL/PCP and CARDIAC     Post op: 2 weeks postop appointment with Pawel Carr     Surgery date specifics: Looking for April 2025 (if we don't have any openings due to cancellations/reschedules, can also look to offer Friday April 11 or Friday April 25) Thank you     Shahid Blankenship MD, FAAOS  [facility] Orthopedic Surgery  Phone: [phone]  Fax: [phone]

## 2025-03-03 NOTE — TELEPHONE ENCOUNTER
Patient called and stated he got an injection and feeling better. Patient would like to cancel surgery. He will call back to reschedule when ready    Surgery Canceled

## 2025-07-07 ENCOUNTER — TELEPHONE (OUTPATIENT)
Dept: PHYSICAL MEDICINE AND REHAB | Facility: CLINIC | Age: 75
End: 2025-07-07

## 2025-07-07 NOTE — TELEPHONE ENCOUNTER
Spoke with patient who was last seen 2021. Patient stated he has been doing well, but needs to come back in due to worsening back pain.    Follow up appointment scheduled for 7/16/25.    Patient appreciative.     Nothing further needed at this time.

## 2025-07-16 ENCOUNTER — HOSPITAL ENCOUNTER (OUTPATIENT)
Dept: GENERAL RADIOLOGY | Facility: HOSPITAL | Age: 75
Discharge: HOME | End: 2025-07-16
Attending: PHYSICAL MEDICINE & REHABILITATION
Payer: MEDICARE

## 2025-07-16 ENCOUNTER — OFFICE VISIT (OUTPATIENT)
Dept: PHYSICAL MEDICINE AND REHAB | Facility: CLINIC | Age: 75
End: 2025-07-16
Payer: MEDICARE

## 2025-07-16 VITALS — WEIGHT: 155 LBS | BODY MASS INDEX: 23.22 KG/M2 | HEIGHT: 68.5 IN

## 2025-07-16 DIAGNOSIS — M25.562 CHRONIC PAIN OF LEFT KNEE: ICD-10-CM

## 2025-07-16 DIAGNOSIS — M17.12 PRIMARY OSTEOARTHRITIS OF LEFT KNEE: ICD-10-CM

## 2025-07-16 DIAGNOSIS — M48.061 LUMBAR FORAMINAL STENOSIS: ICD-10-CM

## 2025-07-16 DIAGNOSIS — M54.16 LUMBAR RADICULOPATHY: ICD-10-CM

## 2025-07-16 DIAGNOSIS — G89.29 CHRONIC PAIN OF LEFT KNEE: ICD-10-CM

## 2025-07-16 DIAGNOSIS — M51.26 LUMBAR HERNIATED DISC: ICD-10-CM

## 2025-07-16 DIAGNOSIS — M48.061 LUMBAR STENOSIS WITHOUT NEUROGENIC CLAUDICATION: ICD-10-CM

## 2025-07-16 DIAGNOSIS — M43.16 SPONDYLOLISTHESIS OF LUMBAR REGION: ICD-10-CM

## 2025-07-16 DIAGNOSIS — M51.9 LUMBAR DISC DISEASE: Primary | ICD-10-CM

## 2025-07-16 DIAGNOSIS — M51.9 LUMBAR DISC DISEASE: ICD-10-CM

## 2025-07-16 DIAGNOSIS — C61 MALIGNANT NEOPLASM OF PROSTATE (HCC): ICD-10-CM

## 2025-07-16 PROCEDURE — 99204 OFFICE O/P NEW MOD 45 MIN: CPT | Performed by: PHYSICAL MEDICINE & REHABILITATION

## 2025-07-16 PROCEDURE — 73562 X-RAY EXAM OF KNEE 3: CPT | Performed by: PHYSICAL MEDICINE & REHABILITATION

## 2025-07-16 PROCEDURE — 73565 X-RAY EXAM OF KNEES: CPT | Performed by: PHYSICAL MEDICINE & REHABILITATION

## 2025-07-16 PROCEDURE — 73564 X-RAY EXAM KNEE 4 OR MORE: CPT | Performed by: PHYSICAL MEDICINE & REHABILITATION

## 2025-07-16 PROCEDURE — 72110 X-RAY EXAM L-2 SPINE 4/>VWS: CPT | Performed by: PHYSICAL MEDICINE & REHABILITATION

## 2025-07-16 NOTE — PROGRESS NOTES
Low Back Pain H & P    Chief Complaint:    Chief Complaint   Patient presents with    New Patient     LOV 12/03/20 pt is here with complaints of back pain. States the pain started to bother him again a couple weeks ago. No n/t. No pain meds or muscle relaxer's. No current physical therapy. Reports pain to be aching . Pain 2/10       HPI:  Mau Cruz is a 75 year old year old.  He is right handed and was last seen on 2/15/2021 for the neck pain and before this he was seen for knee pain and the low back pain before this.  He was treated with PT for all of these which helped.  He will do biking, rowing, and swimming and walking.  He is doing wall squats and some yoga.  He will do weights for the arms and shoulders and upper back.  He will stabilize the low back with the floor, wall, or the Swiss ball.  He will also do band exercises.  He is doing a lot of stretching.  The flexion seems to help the pain (prayer stretch).  He will do twisting and extension (prone press ups).  History of Present Illness  Mau Cruz is a 75 year old male with chronic back pain who presents with worsening back pain.    He has experienced chronic back pain that has progressively worsened over the past six to eight weeks. The pain is primarily located on the right side to the center of his lower back, with no radiation to the hips or legs. It is described as aching and is most severe upon waking in the morning. He manages the pain with stretching, exercise, heat, cold, and occasional use of Aleve. He also uses an ice belt multiple times a day and has a bed that raises to help alleviate morning soreness.    He is a daily exerciser, engaging in activities such as biking, rowing, swimming, and walking, but avoids running. He performs strength exercises, primarily body weight for the legs and weights for the upper body, and incorporates yoga and stretching into his routine. Certain activities, like golfing, can exacerbate the  pain, limiting him to nine holes due to increased soreness. He experiences increased pain after walking three to five miles, but not during the activity itself.    He has a history of a hip replacement approximately 17 years ago, which previously caused significant pain due to nerve issues, but he no longer experiences pain in that area. His right hip clicks during wall squats, though it is not painful. He also has arthritis in both knees, with the left knee being more problematic, and experiences significant pain in his foot after walking.    No numbness or tingling in the legs. Sitting does not bother him, but he experiences tightness upon standing after sitting for a while. He rates his pain as a 2 or 3 out of 10 on a good day, with occasional spikes to 7 or 8 if he does something to aggravate it, though this is rare. Bending over, pulling weeds, or picking up his grandchildren can worsen the pain.    Description of the Pain  The pain is located in the central and right > left low back.    The pain does not radiate..  The pain at its best is 1/10. The pain at its worst is 8/10. The pain is currently  3/10.  The pain is described as a(n) aching sensation.    The pain is worse when bending, going from sitting to standing, in the morning, and after walking.    The pain is better wearing an ice belt.  There is no numbness.  There is no tingling in the legs.  There is not weakness in both legs.     Past Medical History   Past Medical History[1]    Past Surgical History   Past Surgical History[2]    Family History   Family History[3]    Social History   Social History     Socioeconomic History    Marital status:      Spouse name: Not on file    Number of children: Not on file    Years of education: Not on file    Highest education level: Not on file   Occupational History    Not on file   Tobacco Use    Smoking status: Former     Current packs/day: 0.00     Average packs/day: 0.3 packs/day for 19.0 years (5.0  ttl pk-yrs)     Types: Cigarettes     Start date: 1975     Quit date: 1985     Years since quittin.5    Smokeless tobacco: Never    Tobacco comments:     Updated 25   Substance and Sexual Activity    Alcohol use: Yes     Alcohol/week: 4.0 standard drinks of alcohol     Types: 2 Glasses of wine, 2 Shots of liquor per week     Comment: socially     Drug use: No    Sexual activity: Not on file   Other Topics Concern     Service Not Asked    Blood Transfusions Not Asked    Caffeine Concern No    Occupational Exposure Not Asked    Hobby Hazards Not Asked    Sleep Concern Not Asked    Stress Concern Not Asked    Weight Concern Not Asked    Special Diet Not Asked    Back Care Not Asked    Exercise Yes     Comment: intense workout daily     Bike Helmet Not Asked    Seat Belt Not Asked    Self-Exams Not Asked   Social History Narrative    The patient does not use an assistive device..      The patient does live in a home with stairs.     Social Drivers of Health     Food Insecurity: No Food Insecurity (2024)    Food Insecurity     Food Insecurity: Never true   Transportation Needs: No Transportation Needs (2024)    Transportation Needs     Lack of Transportation: No   Stress: Not on file   Housing Stability: Low Risk  (2024)    Housing Stability     Housing Instability: No     Housing Instability Emergency: Not on file     Crib or Bassinette: Not on file       Review of Systems  Patient-reported ROS  Constitutional  Sleep Disturbance: admits  Chills: denies  Fever: denies  Weight Gain: denies  Weight Loss: denies   Cardiovascular  Chest Pain: denies  Irregular Heartbeat: denies   Respiratory  Painful Breathing: denies  Wheezing: denies   Gastrointestinal  Bowel Incontinence: denies  Heartburn: denies  Abdominal Pain: denies  Blood in Stool : denies  Rectal Pain: denies   Hematology  Easy Bruising: denies  Easy Bleeding: denies   Genitourinary  Difficulty Urinating: denies  Bladder  Incontinence: denies  Pelvic Pain: denies  Painful Urination: denies   Musculoskeletal  Joint Stiffness: admits  Painful Joints: admits  Tailbone Pain: denies  Swollen Joints: denies   Peripheral Vascular  Swelling of Legs/Feet: denies  Cold Extremities: denies   Skin  Open Sores: denies  Nodules or Lumps: denies  Rash: denies   Neurological  Loss of Strength Since last Visit: denies  Tingling/Numbness: denies  Balance: denies   Psychiatric  Anxiety: denies  Depressed Mood: denies        PE:  The patient does appear in his stated age in no distress.  The patient is well groomed.    Psychiatric:  The patient is alert and oriented x 3.  The patient has a normal affect and mood.      Respiratory:  No acute respiratory distress. Patient does not have a cough.    HEENT:  Extraocular muscles are intact. There is no kern icterus. Pupils are equal, round, and reactive to light. No redness or discharge bilaterally.    Skin:  There are no rashes or lesions.    Vitals:  There were no vitals filed for this visit.    Gait:    Gait: Normal gait   Sit to Stand: no difficulty   RIGHT Walking on Toes: no difficulty   LEFT Walking on Toes: no difficulty   RIGHT Walking on Heels: no difficulty   LEFT Walking on Heels: no difficulty     Lumbar Spine:    Scoliosis: No scoliosis present   Lumbar Flexion: 120 degrees Painless   Lumbar Extension: 30 degrees Painless     Lumbar Spine Palpation:    Spinous Processes: Non-tender for all Spinous Processes   Z-joints: Non-tender for all Z-joints   SIJ: Non-tender for bilateral SIJ   Piriformis Muscle: Non-tender bilateral Piriformis muscles   Greater Trochanteric Bursa: Non-tender for bilateral Greater Trochanteric Bursa     Vascular lower extremity:   Dorsalis pedis pulse-RIGHT 2+   Dorsalis pedis pulse-LEFT 2+   Tibialis posterior pulse-RIGHT 2+   Tibialis posterior pulse-LEFT 2+      Neurological Lower Extremity:    Light Touch Sensation: Intact in bilateral Lower Extremities   LE Muscle  Strength: All LE strength measurements 5/5 except:  Hamstring RIGHT:   4/5   RIGHT plantar reflexes: downward response   LEFT plantar reflexes: downward response   Reflexes: 1+ in bilateral lower extremities except:  Right Achilles tendon which are absent     Hip: Hips are stable.    RIGHT hip ROM mildly limited   LEFT hip ROM mildly limited   RIGHT hip flexion Negative pain   LEFT hip flexion Negative pain   RIGHT hip NATHALY test Negative for pain   LEFT hip NATHALY test Negative for pain   RIGHT hip internal rotation Negative for pain   LEFT hip internal rotation Negative for pain   RIGHT hip piriformis stretch test Negative for pain   LEFT hip piriformis stretch test Negative for pain     Neural Tension Tests Lumbar Spine:  Sitting straight leg raise-RIGHT Negative for pain   Sitting straight leg raise-LEFT Negative for pain   Supine straight leg raise-RIGHT Negative for pain   Supine straight leg raise-LEFT Negative for pain   Slump test-RIGHT Negative for pain   Slump test-LEFT Negative for pain     Lymph Nodes:   Inguinal Lymph Nodes Absent     Knee  Inspection: no ecchymosis  no erythema  mild swelling of the left knee   Palpation: Not warm, non tender   ROM: Extension full   Tests: No medial laxity, lateral laxity, anterior drawer sign, or posterior drawer sign       Assessment  1. L5-S1 mod diffuse, L4-5 left mod foraminal, L3-4 left mod foraminal & mild-mod diffuse, L2-3 mild-mod diffuse, L1-2 mild diffuse bulging discs    2. L5-S1 bilateral mod, L3-4 left mod foraminal stenosis    3. L4-5 left mild paracentral HNP    4. L3-4 mild, L2-3 mild-mod central stenosis    5. left L5-S1 radiculopathy    6. Malignant neoplasm of prostate (HCC)    7. L5-S1 grade 1-2 spondylolisthesis    8. Primary osteoarthritis of left knee: medial mild-mod, lateral mild, patellar mod    9. Chronic pain of left knee         Plan  Assessment & Plan  Low back pain with possible disc irritation  Chronic low back pain, right-sided,  worsening over 6-8 weeks. Suspected disc irritation with L5-S1 stenosis. Right hamstring weakness suggests nerve irritation.  - Order lumbar spine x-ray with flexion and extension views.  - Advise avoiding waist bending and twisting exercises for one week.  - Instruct on non-aggravating exercises, avoid core twisting.  - Review x-ray results and reassess in one week.    Knee arthritis  Chronic left knee arthritis, mild to moderate. Previous x-rays confirmed arthritis. Discussed non-surgical options with variable success based on severity.  - Order left knee x-ray to assess arthritis status.  - Discuss non-surgical interventions: viscous supplementation, platelet-rich plasma injections.    Orders Placed This Encounter   Procedures    XR LUMBAR SPINE (MIN 4 VIEWS) (CPT=72110)     Standing AP, lateral, Flexion, and Extension views     Standing Status:   Future     Expected Date:   7/16/2025     Expiration Date:   7/16/2026     Scheduling Instructions:      Your order will generate a \"Scheduling Ticket\" that will be available in [facility] to schedule on your own at a time most convenient to you.              If you do not have a [facility] Account, or if you prefer to speak with someone to schedule your appointment, please call [facility] Scheduling at [phone].          XR KNEE BILAT STANDING (CPT=73565)     Standing Status:   Future     Expected Date:   7/16/2025     Expiration Date:   7/16/2026     Scheduling Instructions:      Your order will generate a \"Scheduling Ticket\" that will be available in [facility] to schedule on your own at a time most convenient to you.              If you do not have a [facility] Account, or if you prefer to speak with someone to schedule your appointment, please call [facility] Scheduling at [phone].           Release to patient:   Immediate    XR KNEE (3 VIEWS), LEFT (CPT=73562)     AP/lateral/sunrise     Standing Status:   Future     Expected Date:    7/16/2025     Expiration Date:   7/16/2026     Scheduling Instructions:      To schedule a test at any [facility] Facility call Central Scheduling at       [phone].        Return in about 3 months (around 10/16/2025) for assessment after therapy, reviewing imaging.     Patient should call prior to next visit if new or worsening symptoms.     The patient understands and agrees with the stated plan.    Elroy Carter MD  7/16/2025           [1]   Past Medical History:   Bone spur    Cancer (HCC)    Elevated LFTs    related to statin    History of torn meniscus of left knee    arthroscopy    Hypercholesterolemia    Hyperlipidemia    Kidney stones    Prostate cancer (HCC)    Per NG: [facility] Prostate-Seeds implant with Dr. Starkey    Renal adenoma, right    incidental on CT 5/2021    Rib lesion right 5th    by CT 5/2021   [2]   Past Surgical History:  Procedure Laterality Date    Colonoscopy  10/12, 11/17    Colonoscopy N/A 11/08/2017    Procedure: COLONOSCOPY;  Surgeon: Jose Otoole MD;  Location: [facility] ENDOSCOPY    Colonoscopy  01/2020    Egd N/A 02/03/2017    Procedure: ESOPHAGOGASTRODUODENOSCOPY (EGD);  Surgeon: Jose Otoole MD;  Location: [facility] ENDOSCOPY    Egd  01/2020    w dilation to 51 Fr    Egd scope  2/15, 2/17    stricture dilated    Hip replacement surgery      Other surgical history  01/2024    stent placement    Removal gallbladder      Tonsillectomy      Total hip replacement     [3]   Family History  Problem Relation Age of Onset    Kidney Disease Father         Per NG: Renal disease    Heart Disease Father 88        Per NG: Cause of death

## 2025-07-16 NOTE — PROGRESS NOTES
The following individual(s) verbally consented to be recorded using ambient AI listening technology and understand that they can each withdraw their consent to this listening technology at any point by asking the clinician to turn off or pause the recording:    Patient name: Mau Shahid Anthony  Additional names:  DOMINIK SERRANO

## 2025-07-25 ENCOUNTER — TELEPHONE (OUTPATIENT)
Dept: PHYSICAL MEDICINE AND REHAB | Facility: CLINIC | Age: 75
End: 2025-07-25

## 2025-07-25 DIAGNOSIS — M25.561 CHRONIC PAIN OF RIGHT KNEE: ICD-10-CM

## 2025-07-25 DIAGNOSIS — G89.29 CHRONIC PAIN OF RIGHT KNEE: ICD-10-CM

## 2025-07-25 DIAGNOSIS — M54.16 LUMBAR RADICULOPATHY: Primary | ICD-10-CM

## 2025-07-25 PROBLEM — M17.0 PRIMARY OSTEOARTHRITIS OF BOTH KNEES: Status: ACTIVE | Noted: 2025-07-25

## 2025-07-25 NOTE — TELEPHONE ENCOUNTER
I spoke to the patient and gave him the results of the x-rays.  He will need to have a MRI of the lumbar spine and the right knee x-ray since the right knee pain is only slightly better than the left knee pain.  Orders are placed.  He will need to follow up after having the above.

## 2025-07-25 NOTE — TELEPHONE ENCOUNTER
Patient calling to request to speak to  directly as this was instructed by the physician, this is to go over test results (X-Ray)

## 2025-07-28 NOTE — TELEPHONE ENCOUNTER
RN spoke with patient and scheduled MRI and xray follow up for 8/25/25 at 5:30PM - pt verbalized confirmation. Pt agrees to call to reschedule if able to get MRI completed sooner.

## 2025-07-30 ENCOUNTER — HOSPITAL ENCOUNTER (OUTPATIENT)
Dept: GENERAL RADIOLOGY | Age: 75
Discharge: HOME | End: 2025-07-30
Attending: PHYSICAL MEDICINE & REHABILITATION

## 2025-07-30 ENCOUNTER — HOSPITAL ENCOUNTER (OUTPATIENT)
Dept: MRI IMAGING | Age: 75
Discharge: HOME | End: 2025-07-30
Attending: PHYSICAL MEDICINE & REHABILITATION

## 2025-07-30 DIAGNOSIS — M25.561 CHRONIC PAIN OF RIGHT KNEE: ICD-10-CM

## 2025-07-30 DIAGNOSIS — G89.29 CHRONIC PAIN OF RIGHT KNEE: ICD-10-CM

## 2025-07-30 DIAGNOSIS — M54.16 LUMBAR RADICULOPATHY: ICD-10-CM

## 2025-07-30 PROCEDURE — 72148 MRI LUMBAR SPINE W/O DYE: CPT | Performed by: PHYSICAL MEDICINE & REHABILITATION

## 2025-07-30 PROCEDURE — 73562 X-RAY EXAM OF KNEE 3: CPT | Performed by: PHYSICAL MEDICINE & REHABILITATION

## 2025-08-06 ENCOUNTER — OFFICE VISIT (OUTPATIENT)
Dept: PHYSICAL MEDICINE AND REHAB | Facility: CLINIC | Age: 75
End: 2025-08-06

## 2025-08-06 VITALS — HEIGHT: 68.5 IN | WEIGHT: 155 LBS | BODY MASS INDEX: 23.22 KG/M2

## 2025-08-06 DIAGNOSIS — M43.16 SPONDYLOLISTHESIS OF LUMBAR REGION: ICD-10-CM

## 2025-08-06 DIAGNOSIS — M51.9 LUMBAR DISC DISEASE: ICD-10-CM

## 2025-08-06 DIAGNOSIS — M54.16 LUMBAR RADICULOPATHY: ICD-10-CM

## 2025-08-06 DIAGNOSIS — M17.0 PRIMARY OSTEOARTHRITIS OF BOTH KNEES: ICD-10-CM

## 2025-08-06 DIAGNOSIS — M48.061 LUMBAR STENOSIS WITHOUT NEUROGENIC CLAUDICATION: ICD-10-CM

## 2025-08-06 DIAGNOSIS — M48.061 LUMBAR FORAMINAL STENOSIS: Primary | ICD-10-CM

## 2025-08-06 DIAGNOSIS — M43.10 RETROLISTHESIS: ICD-10-CM

## 2025-08-06 PROBLEM — M51.26 LUMBAR HERNIATED DISC: Status: RESOLVED | Noted: 2017-10-11 | Resolved: 2025-08-06

## 2025-08-06 PROCEDURE — 99214 OFFICE O/P EST MOD 30 MIN: CPT | Performed by: PHYSICAL MEDICINE & REHABILITATION

## 2025-08-11 ENCOUNTER — TELEPHONE (OUTPATIENT)
Dept: PHYSICAL THERAPY | Facility: HOSPITAL | Age: 75
End: 2025-08-11

## 2025-08-14 ENCOUNTER — TELEPHONE (OUTPATIENT)
Dept: PHYSICAL MEDICINE AND REHAB | Facility: CLINIC | Age: 75
End: 2025-08-14

## (undated) DEVICE — Device: Brand: DEFENDO AIR/WATER/SUCTION AND BIOPSY VALVE

## (undated) DEVICE — SNARE CAPTIFLEX MICRO-OVL OLY

## (undated) DEVICE — CATH BALLOON CRE 18-20MM 5838

## (undated) DEVICE — FORCEP RADIAL JAW 4

## (undated) DEVICE — ENDOSCOPY PACK - LOWER: Brand: MEDLINE INDUSTRIES, INC.

## (undated) DEVICE — SNARE OPTMZ PLPCTM TRP

## (undated) DEVICE — ENDOSCOPY PACK UPPER: Brand: MEDLINE INDUSTRIES, INC.

## (undated) NOTE — LETTER
1/13/2020      Durwin Collet A. Tauna Aguas, MD  Physical Medicine and Rehabilitation  [address] [address]  Dept: [phone]  Dept Fax: [phone]        RE: Consultation for Richie Aguilar        Dear Naheed Delarosa MD,    Thank you

## (undated) NOTE — LETTER
2/15/2021      [facility] Viann [facility], MD  Physical Medicine and Rehabilitation  [address] [address]  Dept: [phone]  Dept Fax: [phone]        RE: Consultation for Annah Paget        Dear Jen Muniz MD,    Thank you

## (undated) NOTE — LETTER
08/06/19        [address] [place] [address]      Dear Zulema [facility] records indicate that you have outstanding lab work and or testing that was ordered for you and has not yet been completed:  Orders Placed This Encounter      Ur

## (undated) NOTE — LETTER
25        Orthopedic Surgery   Pre-Operative Clearance Request    Patient Name:   Mau Cruz             :   1950    Surgeon: Dr. Blankenship             Date of Surgery: 2025    Surgical Procedure: Left total knee arthroplasty       MUST COMPLETE ALL OF THE FOLLOWING 2-3 WEEKS PRIOR TO YOUR SURGERY TO AVOID CANCELLATION, DUE TO THE RULE THEIR WILL BE NO EXCEPTIONS!           [x]  Cardiac Clearance                                      **Please fax test results, H&P, and clearance to [phone] and to P.A.T at [phone]**

## (undated) NOTE — LETTER
12/3/2020      Laina Cruz MD  Physical Medicine and Rehabilitation  [address] [facility], [address] [place] [address]  Dept: [phone]  Dept Fax: [phone]        RE: Consultation for Anam Franco        Dear Piedad Segovia MD,    Thank you

## (undated) NOTE — LETTER
7/16/2025      Elroy Carter MD  Physical Medicine and Rehabilitation  [address] [place] [address], [address]  [facility] [address]  Dept: [phone]  Dept Fax: [phone]        RE: Consultation for Mau Cruz        Dear ZION SAAVEDRA MD,    Thank you very much for the opportunity to see your patient.  Attached please find a summary from your patient's recent visit.     I appreciate the chance to take care of your patient with you.  Please feel free to call me with any questions or concerns.    Sincerely,        Elroy Carter MD  Electronically Signed on 7/16/2025

## (undated) NOTE — LETTER
24      Orthopedic Surgery   Pre-Operative Clearance Request    Patient Name:   Mau Cruz             :   1950    Surgeon: Dr. Blankenship             Date of Surgery: 10/17/2024    Surgical Procedure: Left total knee arthroplasty       MUST COMPLETE ALL OF THE FOLLOWING 2-3 WEEKS PRIOR TO YOUR SURGERY TO AVOID CANCELLATION, DUE TO THE RULE THEIR WILL BE NO EXCEPTIONS!    [x]  History and Physical     [x]  Medical  Clearance                   Required pre-op testing to be ordered:                      [x]  CBC W/Diff                                                                 [x]  CMP                                                                                                                                                                                                                                   [x]  PT/INR  [x]  PTT  [x]  Type and Screen                     **Please fax test results, H&P, and clearance to [phone] and to P.A.T at [phone]**

## (undated) NOTE — LETTER
10/24/2019      Leonardo Seaman MD  Physical Medicine and Rehabilitation  [address] [address]  Dept: [phone]  Dept Fax: [phone]        RE: Consultation for Lindi Skiff        Dear Yash Alford MD,    Thank yo

## (undated) NOTE — LETTER
25        Orthopedic Surgery   Pre-Operative Clearance Request    Patient Name:   Mau Cruz             :   1950    Surgeon: Dr. Blankenship             Date of Surgery: 2025    Surgical Procedure: Left total knee arthroplasty      MUST COMPLETE ALL OF THE FOLLOWING 2-3 WEEKS PRIOR TO YOUR SURGERY TO AVOID CANCELLATION, DUE TO THE RULE THEIR WILL BE NO EXCEPTIONS!      [x]  History and Physical      [x]  Medical  Clearance                     Required pre-op testing to be ordered:                      [x]  CBC W/Diff                                                                 [x]  CMP                                                                                                                                                                                                                                  [x]  PT/INR  [x]  PTT  [x]  Type and Screen                     **Please fax test results, H&P, and clearance to [phone] and to P.A.T at [phone]**

## (undated) NOTE — IP AVS SNAPSHOT
[facility]    [address], [place], [place] ~ [phone]                Discharge Summary   2/3/2017    Tessa Reyes           Admission Information        Provider Department    2/3/2017 Dianne Coleman MD [facility] End[facility] Medicaid plans. To get signed up and covered, please call [phone] and ask to get set up for an insurance coverage that is in-network with [facility].         Visit Information        Department Dept Phone    2/3/2017  9:34 AM Samara Roach

## (undated) NOTE — LETTER
24        Orthopedic Surgery   Pre-Operative Clearance Request    Patient Name:   Mau Cruz             :   1950    Surgeon: Dr. Blankenship             Date of Surgery: 10/17/2024    Surgical Procedure: Left total knee arthroplasty       MUST COMPLETE ALL OF THE FOLLOWING 2-3 WEEKS PRIOR TO YOUR SURGERY TO AVOID CANCELLATION, DUE TO THE RULE THEIR WILL BE NO EXCEPTIONS!              [x]  Cardiac Clearance                        **Please fax test results, H&P, and clearance to [phone] and to P.A.T at [phone]**

## (undated) NOTE — LETTER
7/13/2017      Bereket Johnson MD  Physical Medicine and Rehabilitation  [address] [address]  Dept: [phone]  Dept Fax: [phone]        RE: Consultation for Pam Watt        Dear El Vega MD,    Thank you